# Patient Record
Sex: FEMALE | Race: WHITE | NOT HISPANIC OR LATINO | Employment: FULL TIME | URBAN - METROPOLITAN AREA
[De-identification: names, ages, dates, MRNs, and addresses within clinical notes are randomized per-mention and may not be internally consistent; named-entity substitution may affect disease eponyms.]

---

## 2022-06-29 LAB
EXTERNAL HEPATITIS B SURFACE ANTIGEN: NEGATIVE
EXTERNAL HEPATITIS C AB: NEGATIVE
EXTERNAL RUBELLA QUALITATIVE: NORMAL
EXTERNAL SYPHILIS RPR SCREEN: NORMAL
HIV1 P24 AG SERPL QL IA: NORMAL

## 2022-07-01 ENCOUNTER — TRANSCRIBE ORDERS (OUTPATIENT)
Dept: ULTRASOUND IMAGING | Facility: HOSPITAL | Age: 32
End: 2022-07-01

## 2022-07-01 DIAGNOSIS — D68.51 HETEROZYGOUS FACTOR V LEIDEN MUTATION: Primary | ICD-10-CM

## 2022-08-03 ENCOUNTER — CONSULT (OUTPATIENT)
Dept: ONCOLOGY | Facility: CLINIC | Age: 32
End: 2022-08-03

## 2022-08-03 ENCOUNTER — LAB (OUTPATIENT)
Dept: LAB | Facility: HOSPITAL | Age: 32
End: 2022-08-03

## 2022-08-03 VITALS
OXYGEN SATURATION: 100 % | SYSTOLIC BLOOD PRESSURE: 137 MMHG | RESPIRATION RATE: 18 BRPM | HEART RATE: 70 BPM | TEMPERATURE: 97.3 F | WEIGHT: 179.6 LBS | BODY MASS INDEX: 29.92 KG/M2 | DIASTOLIC BLOOD PRESSURE: 84 MMHG | HEIGHT: 65 IN

## 2022-08-03 DIAGNOSIS — D68.51 HETEROZYGOUS FACTOR V LEIDEN MUTATION: Primary | ICD-10-CM

## 2022-08-03 DIAGNOSIS — Z31.83 IN VITRO FERTILIZATION: ICD-10-CM

## 2022-08-03 DIAGNOSIS — Z34.90 PREGNANCY, UNSPECIFIED GESTATIONAL AGE: ICD-10-CM

## 2022-08-03 DIAGNOSIS — N97.9 INFERTILITY, FEMALE: ICD-10-CM

## 2022-08-03 DIAGNOSIS — O99.119 FACTOR V LEIDEN MUTATION AFFECTING PREGNANCY: Primary | ICD-10-CM

## 2022-08-03 DIAGNOSIS — D68.51 FACTOR V LEIDEN MUTATION AFFECTING PREGNANCY: Primary | ICD-10-CM

## 2022-08-03 DIAGNOSIS — O24.012 TYPE 1 DIABETES MELLITUS AFFECTING PREGNANCY IN SECOND TRIMESTER, ANTEPARTUM: ICD-10-CM

## 2022-08-03 LAB
BASOPHILS # BLD AUTO: 0.04 10*3/MM3 (ref 0–0.2)
BASOPHILS NFR BLD AUTO: 0.4 % (ref 0–1.5)
DEPRECATED RDW RBC AUTO: 43.7 FL (ref 37–54)
EOSINOPHIL # BLD AUTO: 0.08 10*3/MM3 (ref 0–0.4)
EOSINOPHIL NFR BLD AUTO: 0.8 % (ref 0.3–6.2)
ERYTHROCYTE [DISTWIDTH] IN BLOOD BY AUTOMATED COUNT: 13.2 % (ref 12.3–15.4)
HCT VFR BLD AUTO: 37.8 % (ref 34–46.6)
HGB BLD-MCNC: 13.2 G/DL (ref 12–15.9)
IMM GRANULOCYTES # BLD AUTO: 0.12 10*3/MM3 (ref 0–0.05)
IMM GRANULOCYTES NFR BLD AUTO: 1.1 % (ref 0–0.5)
LYMPHOCYTES # BLD AUTO: 1.84 10*3/MM3 (ref 0.7–3.1)
LYMPHOCYTES NFR BLD AUTO: 17.5 % (ref 19.6–45.3)
MCH RBC QN AUTO: 31.8 PG (ref 26.6–33)
MCHC RBC AUTO-ENTMCNC: 34.9 G/DL (ref 31.5–35.7)
MCV RBC AUTO: 91.1 FL (ref 79–97)
MONOCYTES # BLD AUTO: 0.76 10*3/MM3 (ref 0.1–0.9)
MONOCYTES NFR BLD AUTO: 7.2 % (ref 5–12)
NEUTROPHILS NFR BLD AUTO: 7.69 10*3/MM3 (ref 1.7–7)
NEUTROPHILS NFR BLD AUTO: 73 % (ref 42.7–76)
NRBC BLD AUTO-RTO: 0 /100 WBC (ref 0–0.2)
PLATELET # BLD AUTO: 190 10*3/MM3 (ref 140–450)
PMV BLD AUTO: 11.2 FL (ref 6–12)
RBC # BLD AUTO: 4.15 10*6/MM3 (ref 3.77–5.28)
WBC NRBC COR # BLD: 10.53 10*3/MM3 (ref 3.4–10.8)

## 2022-08-03 PROCEDURE — 99204 OFFICE O/P NEW MOD 45 MIN: CPT | Performed by: INTERNAL MEDICINE

## 2022-08-03 PROCEDURE — 85025 COMPLETE CBC W/AUTO DIFF WBC: CPT

## 2022-08-03 PROCEDURE — 36415 COLL VENOUS BLD VENIPUNCTURE: CPT

## 2022-08-03 RX ORDER — PROCHLORPERAZINE 25 MG/1
SUPPOSITORY RECTAL
COMMUNITY
Start: 2022-07-05

## 2022-08-03 RX ORDER — ENOXAPARIN SODIUM 100 MG/ML
INJECTION SUBCUTANEOUS
COMMUNITY
Start: 2022-07-06 | End: 2023-01-08 | Stop reason: HOSPADM

## 2022-08-03 RX ORDER — ASPIRIN 81 MG/1
81 TABLET ORAL DAILY
Status: ON HOLD | COMMUNITY
End: 2023-01-03

## 2022-08-03 RX ORDER — INSULIN LISPRO 100 [IU]/ML
INJECTION, SOLUTION INTRAVENOUS; SUBCUTANEOUS
COMMUNITY
Start: 2022-06-13

## 2022-08-03 RX ORDER — PROCHLORPERAZINE 25 MG/1
SUPPOSITORY RECTAL SEE ADMIN INSTRUCTIONS
COMMUNITY
Start: 2022-06-09

## 2022-08-03 RX ORDER — ENOXAPARIN SODIUM 100 MG/ML
INJECTION SUBCUTANEOUS
COMMUNITY
Start: 2022-07-14 | End: 2022-12-20 | Stop reason: ALTCHOICE

## 2022-08-03 NOTE — PROGRESS NOTES
Subjective     REASON FOR CONSULTATION: Heterozygosity for factor V Leiden  Provide an opinion on any further workup or treatment                             REQUESTING PHYSICIAN:  Melissa Leonard MD    RECORDS OBTAINED:  Records of the patients history including those obtained from the referring provider were reviewed and summarized in detail.    HISTORY OF PRESENT ILLNESS:  The patient is a 32 y.o. year old female who is here for an opinion about the above issue.  She is referred to us from her OB/GYN office for recommendations regarding heterozygosity for factor V Leiden during pregnancy.  She was tested for factor V Leiden due to the fact that her father developed pulmonary embolus and was found to be 8 carrier of factor V Leiden mutation.  She does not have any personal history of thrombosis.     She has been undergoing therapy for infertility and this pregnancy is the result of in vitro fertilization.  She also has higher risk of pregnancy complications due to being a type I diabetic.    She was started on prophylactic Lovenox injections by her fertility MD after egg retrieval.  She has not had any significant side effects or problems from the Lovenox injections.    She also will be having a maternal-fetal medicine visit in September.    Her due date is 1/21/2023 and she plans to deliver by spontaneous vaginal delivery with an epidural if possible.    History of Present Illness     Past Medical History:   Diagnosis Date   • Diabetes (HCC)         Past Surgical History:   Procedure Laterality Date   • EYE SURGERY  2021    Laser eye treatment-diabetic retinopathy        Current Outpatient Medications on File Prior to Visit   Medication Sig Dispense Refill   • aspirin 81 MG EC tablet Take 81 mg by mouth Daily.     • Continuous Blood Gluc Sensor (Dexcom G6 Sensor) APPLY 1 SENSOR EVERY 10 DAYS     • Continuous Blood Gluc Transmit (Dexcom G6 Transmitter) misc See Admin Instructions.     • Enoxaparin Sodium (LOVENOX)  30 MG/0.3ML solution prefilled syringe syringe ADMINISTER 1 SYRINGE UNDER THE SKIN EVERY DAY AS DIRECTED     • Enoxaparin Sodium (LOVENOX) 40 MG/0.4ML solution prefilled syringe syringe INJECT 0.4 MILLITER DAILY     • Insulin Glargine (LANTUS SOLOSTAR) 100 UNIT/ML injection pen Inject 20 Units under the skin into the appropriate area as directed.     • insulin lispro (humaLOG) 100 UNIT/ML injection Inject  under the skin 3 (Three) Times a Day Before Meals.     • Insulin Lispro (humaLOG) 100 UNIT/ML injection USE 50 UNITS IN PUMP AS DIRECTED     • Levonorgestrel-Ethinyl Estrad (VIENVA PO) Take  by mouth.     • Loratadine (CLARITIN PO) Take  by mouth.     • Prenatal Vit-Fe Fumarate-FA (PRENATAL VITAMIN PO) Take  by mouth.       No current facility-administered medications on file prior to visit.        ALLERGIES:    Allergies   Allergen Reactions   • Adhesive Tape Rash     Very sensitive        Social History     Socioeconomic History   • Marital status:    Tobacco Use   • Smoking status: Never Smoker   • Smokeless tobacco: Never Used   Substance and Sexual Activity   • Alcohol use: Yes        Family History   Problem Relation Age of Onset   • Diabetes Other         Maternal        Review of Systems   Constitutional: Negative for activity change, chills, fatigue and fever.   HENT: Negative for mouth sores, trouble swallowing and voice change.    Eyes: Negative for pain and visual disturbance.   Respiratory: Negative for cough, shortness of breath and wheezing.    Cardiovascular: Negative for chest pain and palpitations.   Gastrointestinal: Negative for abdominal pain, constipation, diarrhea, nausea and vomiting.   Genitourinary: Negative for difficulty urinating, frequency and urgency.   Musculoskeletal: Negative for arthralgias and joint swelling.   Skin: Negative for rash.   Neurological: Negative for dizziness, seizures, weakness and headaches.   Hematological: Negative for adenopathy. Does not bruise/bleed  "easily.   Psychiatric/Behavioral: Negative for behavioral problems and confusion. The patient is not nervous/anxious.         Objective     Vitals:    08/03/22 1018   BP: 137/84   Pulse: 70   Resp: 18   Temp: 97.3 °F (36.3 °C)   TempSrc: Temporal   SpO2: 100%   Weight: 81.5 kg (179 lb 9.6 oz)   Height: 165.1 cm (65\")   PainSc: 0-No pain     Current Status 8/3/2022   ECOG score 0       Physical Exam  Constitutional:       General: She is not in acute distress.     Appearance: She is well-developed.   HENT:      Head: Normocephalic.   Eyes:      General: No scleral icterus.     Conjunctiva/sclera: Conjunctivae normal.      Pupils: Pupils are equal, round, and reactive to light.   Neck:      Thyroid: No thyromegaly.      Vascular: No JVD.   Cardiovascular:      Rate and Rhythm: Normal rate and regular rhythm.      Heart sounds: No murmur heard.    No friction rub. No gallop.   Pulmonary:      Effort: Pulmonary effort is normal.      Breath sounds: Normal breath sounds. No wheezing or rales.   Abdominal:      General: There is no distension.      Palpations: Abdomen is soft. There is no mass.      Tenderness: There is no abdominal tenderness.   Musculoskeletal:         General: No deformity. Normal range of motion.      Cervical back: Normal range of motion and neck supple.   Lymphadenopathy:      Cervical: No cervical adenopathy.   Skin:     General: Skin is warm and dry.      Findings: No erythema or rash.   Neurological:      Mental Status: She is alert and oriented to person, place, and time.      Cranial Nerves: No cranial nerve deficit.      Deep Tendon Reflexes: Reflexes are normal and symmetric.   Psychiatric:         Behavior: Behavior normal.         Judgment: Judgment normal.           RECENT LABS:  Hematology WBC   Date Value Ref Range Status   08/03/2022 10.53 3.40 - 10.80 10*3/mm3 Final     RBC   Date Value Ref Range Status   08/03/2022 4.15 3.77 - 5.28 10*6/mm3 Final     Hemoglobin   Date Value Ref Range " Status   08/03/2022 13.2 12.0 - 15.9 g/dL Final     Hematocrit   Date Value Ref Range Status   08/03/2022 37.8 34.0 - 46.6 % Final     Platelets   Date Value Ref Range Status   08/03/2022 190 140 - 450 10*3/mm3 Final          Assessment & Plan     1.  Inherited thrombophilia with heterozygosity for factor V Leiden.  She has no personal history of thrombosis to date.  2.  Pregnancy as a result of in vitro fertilization.  3.  Type 1 diabetes.    Recommendations  1.  We discussed the pros and cons of continuing prophylactic Lovenox with the patient at length today.  We explained that in general if she did not have any other confounding issues we would not routinely anticoagulate her due to the heterozygous factor V Leiden mutation since she has not had any personal history of thrombosis.  However, this is a ger pregnancy with the patient having issues with infertility in this pregnancy being the result of in vitro fertilization.  She also is felt to be higher risk pregnancy due to her type 1 diabetes.  With all these factors taken into consideration we have recommended that she continue on prophylactic Lovenox injections throughout the pregnancy with plans to switch to unfractionated heparin at 36-38 weeks gestation.  2.  We will continue to see the patient monthly while she is on the Lovenox injections and will continue to monitor her blood counts closely.  3.  I told the patient that we would be happy to switch her to unfractionated heparin at the appropriate time.  4.  The patient was instructed to notify us immediately if she has any unusual vaginal spotting or other types of bleeding.  5.  I told the patient at this time that she could discontinue her baby aspirin.  6.  We will make a decision down the road regarding whether or not to continue prophylactic anticoagulation postpartum.    Thanks for allowing us to see this nice patient in consultation.

## 2022-08-04 ENCOUNTER — APPOINTMENT (OUTPATIENT)
Dept: LAB | Facility: HOSPITAL | Age: 32
End: 2022-08-04

## 2022-09-07 ENCOUNTER — TRANSCRIBE ORDERS (OUTPATIENT)
Dept: ULTRASOUND IMAGING | Facility: HOSPITAL | Age: 32
End: 2022-09-07

## 2022-09-07 ENCOUNTER — OFFICE VISIT (OUTPATIENT)
Dept: OBSTETRICS AND GYNECOLOGY | Facility: CLINIC | Age: 32
End: 2022-09-07

## 2022-09-07 ENCOUNTER — HOSPITAL ENCOUNTER (OUTPATIENT)
Dept: ULTRASOUND IMAGING | Facility: HOSPITAL | Age: 32
Discharge: HOME | End: 2022-09-07
Admitting: OBSTETRICS & GYNECOLOGY

## 2022-09-07 VITALS
WEIGHT: 185.6 LBS | DIASTOLIC BLOOD PRESSURE: 67 MMHG | SYSTOLIC BLOOD PRESSURE: 115 MMHG | TEMPERATURE: 97.9 F | BODY MASS INDEX: 29.83 KG/M2 | HEART RATE: 81 BPM | HEIGHT: 66 IN

## 2022-09-07 DIAGNOSIS — O24.012 TYPE 1 DIABETES MELLITUS AFFECTING PREGNANCY IN SECOND TRIMESTER, ANTEPARTUM: Primary | ICD-10-CM

## 2022-09-07 DIAGNOSIS — D68.51 HETEROZYGOUS FACTOR V LEIDEN MUTATION: Primary | ICD-10-CM

## 2022-09-07 DIAGNOSIS — O24.012 TYPE 1 DIABETES MELLITUS AFFECTING PREGNANCY IN SECOND TRIMESTER, ANTEPARTUM: ICD-10-CM

## 2022-09-07 DIAGNOSIS — D68.51 HETEROZYGOUS FACTOR V LEIDEN MUTATION: ICD-10-CM

## 2022-09-07 DIAGNOSIS — O99.119 FACTOR V LEIDEN MUTATION AFFECTING PREGNANCY: ICD-10-CM

## 2022-09-07 DIAGNOSIS — D68.51 FACTOR V LEIDEN MUTATION AFFECTING PREGNANCY: ICD-10-CM

## 2022-09-07 PROCEDURE — 76817 TRANSVAGINAL US OBSTETRIC: CPT

## 2022-09-07 PROCEDURE — 76811 OB US DETAILED SNGL FETUS: CPT

## 2022-09-07 PROCEDURE — 76811 OB US DETAILED SNGL FETUS: CPT | Performed by: OBSTETRICS & GYNECOLOGY

## 2022-09-07 PROCEDURE — 99204 OFFICE O/P NEW MOD 45 MIN: CPT | Performed by: OBSTETRICS & GYNECOLOGY

## 2022-09-07 PROCEDURE — 76817 TRANSVAGINAL US OBSTETRIC: CPT | Performed by: OBSTETRICS & GYNECOLOGY

## 2022-09-07 RX ORDER — ASPIRIN 81 MG/1
81 TABLET ORAL DAILY
Qty: 90 TABLET | Refills: 4 | Status: SHIPPED | OUTPATIENT
Start: 2022-09-07 | End: 2023-01-08 | Stop reason: HOSPADM

## 2022-09-07 NOTE — PROGRESS NOTES
MATERNAL FETAL MEDICINE Consult Note    Dear  No ref. provider found:    Thank you for your kind referral of Pat Finley.  As you know, she is a 32 y.o. G  P  at   /7 weeks gestation (Estimated Date of Delivery: None noted.). This is a consult    Her antepartum course is complicated by:  Type 1 DM  Factor V Leiden Heterozygous with FH PE    Aneuploidy Screening: low risk panorama male      HPI: Today, she denies headache, blurry vision, RUQ pain. No vaginal bleeding, no contractions.     Review of History:  Past Medical History:   Diagnosis Date   • Diabetes type I (HCC)     [facility]- Dr. Win and Anita MA   • Heterozygous factor V Leiden mutation (HCC)    •  product of IVF pregnancy      Past Surgical History:   Procedure Laterality Date   • EYE SURGERY      Laser eye treatment-diabetic retinopathy       Social History     Socioeconomic History   • Marital status:    Tobacco Use   • Smoking status: Never Smoker   • Smokeless tobacco: Never Used   Vaping Use   • Vaping Use: Never used   Substance and Sexual Activity   • Alcohol use: Not Currently   • Drug use: Never   • Sexual activity: Yes     Partners: Male     Family History   Problem Relation Age of Onset   • Factor V Leiden deficiency Father    • Pulmonary embolism Father    • Diabetes Other         Maternal      Allergies   Allergen Reactions   • Adhesive Tape Rash     Very sensitive      Current Outpatient Medications on File Prior to Visit   Medication Sig Dispense Refill   • Continuous Blood Gluc Sensor (Dexcom G6 Sensor) APPLY 1 SENSOR EVERY 10 DAYS     • Continuous Blood Gluc Transmit (Dexcom G6 Transmitter) misc See Admin Instructions.     • Enoxaparin Sodium (LOVENOX) 40 MG/0.4ML solution prefilled syringe syringe INJECT 0.4 MILLITER DAILY     • Insulin Glargine (LANTUS SOLOSTAR) 100 UNIT/ML injection pen Inject 20 Units under the skin into the appropriate area as directed. As needed for  "pump failure     • Insulin Lispro (humaLOG) 100 UNIT/ML injection USE 50 UNITS IN PUMP AS DIRECTED     • Prenatal Vit-Fe Fumarate-FA (PRENATAL VITAMIN PO) Take  by mouth.     • aspirin 81 MG EC tablet Take 81 mg by mouth Daily.     • Enoxaparin Sodium (LOVENOX) 30 MG/0.3ML solution prefilled syringe syringe ADMINISTER 1 SYRINGE UNDER THE SKIN EVERY DAY AS DIRECTED     • insulin lispro (humaLOG) 100 UNIT/ML injection Inject  under the skin 3 (Three) Times a Day Before Meals.     • Loratadine (CLARITIN PO) Take  by mouth.       No current facility-administered medications on file prior to visit.        Past obstetric, gynecological, medical, surgical, family and social history reviewed.  Relevant lab work and imaging reviewed.    Review of systems  Constitutional:  denies fever, chills, malaise.   ENT/Mouth:  denies sore throat, tinnitis  Eyes: denies vision changes/pain  CV:  denies chest pain  Respiratory:  denies cough/SOB  GI:  denies N/V, diarrhea, abdominal pain.    :   denies dysuria  Skin:  denies lesions or pruritis   Neuro:  denies weakness, focal neurologic symptoms    Vitals:    22 0903   BP: 115/67   BP Location: Right arm   Patient Position: Sitting   Pulse: 81   Temp: 97.9 °F (36.6 °C)   TempSrc: Temporal   Weight: 84.2 kg (185 lb 9.6 oz)   Height: 167.6 cm (66\")       PHYSICAL EXAM   GENERAL: Not in acute distress, AAOx3, pleasant  CARDIO: regular rate and rhythm  PULM: symmetric chest rise, speaking in complete sentences without difficulty  NEURO: awake, alert and oriented to person, place, and time  ABDOMINAL: No fundal tenderness, no rebound or guarding, gravid  EXTREMITIES: no bilateral lower extremity edema/tenderness  SKIN: Warm, well-perfused      ULTRASOUND   Please view full ultrasound note on Imaging tab in [employer].      ASSESSMENT/COUNSELIN y.o. G1  P  at 20 4 /7 weeks gestation (Estimated Date of Delivery: None noted.)    -Pregnancy  [ X ] stable  [   ] improving [  ] " worsening    There are no diagnoses linked to this encounter.     -Factor V Leiden heterozygous   Has met with hematology and decision was made to continue prophylactic lovenox due to IVF and Type 1 DM.  I would also recommend postpartum anticoagulation if we are going to anticoagulate her in the antepartum period as postpartum is the highest risk time for a blood clot and postpartum anticoagulation should never be less than antepartum for this reason.  She should be continued for 6 weeks postpartum on Lovenox, especially given her family history of a PE in her father.      Pregestational DM, Type 1  Counseling: I explained to her that patients with pregestational diabetes have a variety of pregnancy related risks that are related to their level of glycemic control.  She reports she has not had a recent optho appt and had an EKG within the last couple years, but we also ordered that.      We discussed  the increased risks related to diabetes including but not limited to congenital anomalies, fetal growth disorders (FGR or LGA), indicated or spontaneous  birth, macrosomia, polyhydramnios, birth trauma,  complications (jaundice, hypoglycemia, NICU admit) and stillbirth with uncontrolled glucose.   We discussed all of this increased linearly with increasing blood sugars and A1c and her last A1c was <5.    We discussed goal pre-prandial values below 90 and 1-hour post-prandial values below 120 to optimize her pregnancy outcome as many studies have demonstrated that this is the normal range for pregnant women without diabetes.  She is seeing her endocrinologist and would like to continue managing with them.  I offered our services if she ever wants to discuss further as the pregnancy progresses.       We also discussed preeclampsia risk reduction with baby aspirin, which she will begin taking daily.    I explained that I recommend fetal echocardiogram after her midtrimester as well as detailed fetal  ultrasound for anatomy at that time in our office.      CP cyst:  Discussed that in the setting of a normal cell free DNA, this had no clincal significance and would not cause the baby any issues.  I fully expect it to be resolved on her next couple of scans.     Summary of Plan  -Serial growth ultrasounds every 4 weeks with 1-2x per week  fetal surveillance after 32 weeks.  She would like to do these here.     -Follow with endo per pt request for [id number] are available at the [facility] office if she changes her mind      Follow-up: Follow up 4 weeks growth.  2 weeks fetal ECHO for T1DM and IVF.      Thank you for the consult and opportunity to care for this patient.  Please feel free to reach out with any questions or concerns.      I spent 35 minutes caring for this patient on this date of service. This time includes time spent by me in the following activities: preparing for the visit, reviewing tests, obtaining and/or reviewing a separately obtained history, performing a medically appropriate examination and/or evaluation, counseling and educating the patient/family/caregiver and independently interpreting results and communicating that information with the patient/family/caregiver with greater than 50% spent in counseling and coordination of care.     Lawanda Valente MD FACOG  Maternal Fetal Medicine-[facility]  Office: [phone]  [email or web address]

## 2022-09-07 NOTE — PROGRESS NOTES
Pt reports that she is doing well and denies vaginal bleeding, cramping, contractions or LOF at this time. Reports feeling flutters at this time. Denies HA, visual changes or epigastric pain. Denies any additional complaints at time of appointment. Next OB appointment scheduled for next week.   Pt reports her Type I DM is currently being managed by her endocrinologist at this time. Notes that she sees them in office once a month, with last appointment being yesterday (9/6) and that she is in communication with the PA every other week. Notes that her most recent A1C was 4.8.    Vitals:    09/07/22 0903   BP: 115/67   Pulse: 81   Temp: 97.9 °F (36.6 °C)

## 2022-09-08 ENCOUNTER — LAB (OUTPATIENT)
Dept: LAB | Facility: HOSPITAL | Age: 32
End: 2022-09-08

## 2022-09-08 ENCOUNTER — OFFICE VISIT (OUTPATIENT)
Dept: ONCOLOGY | Facility: CLINIC | Age: 32
End: 2022-09-08

## 2022-09-08 ENCOUNTER — APPOINTMENT (OUTPATIENT)
Dept: LAB | Facility: HOSPITAL | Age: 32
End: 2022-09-08

## 2022-09-08 VITALS
SYSTOLIC BLOOD PRESSURE: 113 MMHG | HEIGHT: 66 IN | TEMPERATURE: 97.3 F | DIASTOLIC BLOOD PRESSURE: 76 MMHG | HEART RATE: 81 BPM | RESPIRATION RATE: 18 BRPM | BODY MASS INDEX: 29.68 KG/M2 | OXYGEN SATURATION: 100 % | WEIGHT: 184.7 LBS

## 2022-09-08 DIAGNOSIS — O24.012 TYPE 1 DIABETES MELLITUS AFFECTING PREGNANCY IN SECOND TRIMESTER, ANTEPARTUM: ICD-10-CM

## 2022-09-08 DIAGNOSIS — Z34.90 PREGNANCY, UNSPECIFIED GESTATIONAL AGE: ICD-10-CM

## 2022-09-08 DIAGNOSIS — O99.119 FACTOR V LEIDEN MUTATION AFFECTING PREGNANCY: ICD-10-CM

## 2022-09-08 DIAGNOSIS — Z31.83 IN VITRO FERTILIZATION: ICD-10-CM

## 2022-09-08 DIAGNOSIS — N97.9 INFERTILITY, FEMALE: ICD-10-CM

## 2022-09-08 DIAGNOSIS — O99.119 FACTOR V LEIDEN MUTATION AFFECTING PREGNANCY: Primary | ICD-10-CM

## 2022-09-08 DIAGNOSIS — D68.51 FACTOR V LEIDEN MUTATION AFFECTING PREGNANCY: ICD-10-CM

## 2022-09-08 DIAGNOSIS — D68.51 FACTOR V LEIDEN MUTATION AFFECTING PREGNANCY: Primary | ICD-10-CM

## 2022-09-08 LAB
BASOPHILS # BLD AUTO: 0.03 10*3/MM3 (ref 0–0.2)
BASOPHILS NFR BLD AUTO: 0.3 % (ref 0–1.5)
DEPRECATED RDW RBC AUTO: 43.4 FL (ref 37–54)
EOSINOPHIL # BLD AUTO: 0.08 10*3/MM3 (ref 0–0.4)
EOSINOPHIL NFR BLD AUTO: 0.7 % (ref 0.3–6.2)
ERYTHROCYTE [DISTWIDTH] IN BLOOD BY AUTOMATED COUNT: 13.1 % (ref 12.3–15.4)
HCT VFR BLD AUTO: 34.7 % (ref 34–46.6)
HGB BLD-MCNC: 12 G/DL (ref 12–15.9)
IMM GRANULOCYTES # BLD AUTO: 0.1 10*3/MM3 (ref 0–0.05)
IMM GRANULOCYTES NFR BLD AUTO: 0.8 % (ref 0–0.5)
LYMPHOCYTES # BLD AUTO: 1.74 10*3/MM3 (ref 0.7–3.1)
LYMPHOCYTES NFR BLD AUTO: 14.7 % (ref 19.6–45.3)
MCH RBC QN AUTO: 31.7 PG (ref 26.6–33)
MCHC RBC AUTO-ENTMCNC: 34.6 G/DL (ref 31.5–35.7)
MCV RBC AUTO: 91.8 FL (ref 79–97)
MONOCYTES # BLD AUTO: 0.76 10*3/MM3 (ref 0.1–0.9)
MONOCYTES NFR BLD AUTO: 6.4 % (ref 5–12)
NEUTROPHILS NFR BLD AUTO: 77.1 % (ref 42.7–76)
NEUTROPHILS NFR BLD AUTO: 9.16 10*3/MM3 (ref 1.7–7)
NRBC BLD AUTO-RTO: 0 /100 WBC (ref 0–0.2)
PLATELET # BLD AUTO: 221 10*3/MM3 (ref 140–450)
PMV BLD AUTO: 10.9 FL (ref 6–12)
RBC # BLD AUTO: 3.78 10*6/MM3 (ref 3.77–5.28)
WBC NRBC COR # BLD: 11.87 10*3/MM3 (ref 3.4–10.8)

## 2022-09-08 PROCEDURE — 99213 OFFICE O/P EST LOW 20 MIN: CPT | Performed by: INTERNAL MEDICINE

## 2022-09-08 PROCEDURE — 85025 COMPLETE CBC W/AUTO DIFF WBC: CPT

## 2022-09-08 PROCEDURE — 36415 COLL VENOUS BLD VENIPUNCTURE: CPT

## 2022-09-08 NOTE — PROGRESS NOTES
Subjective     REASON FOR CONSULTATION: Heterozygosity for factor V Leiden  Provide an opinion on any further workup or treatment                             REQUESTING PHYSICIAN:  Melissa Leonard MD    RECORDS OBTAINED:  Records of the patients history including those obtained from the referring provider were reviewed and summarized in detail.    HISTORY OF PRESENT ILLNESS:  The patient is a 32 y.o. year old female who is here for an opinion about the above issue.  She is referred to us from her OB/GYN office for recommendations regarding heterozygosity for factor V Leiden during pregnancy.  She was tested for factor V Leiden due to the fact that her father developed pulmonary embolus and was found to be 8 carrier of factor V Leiden mutation.  She does not have any personal history of thrombosis.     She has been undergoing therapy for infertility and this pregnancy is the result of in vitro fertilization.  She also has higher risk of pregnancy complications due to being a type I diabetic.    She was started on prophylactic Lovenox injections by her fertility MD after egg retrieval.  She has not had any significant side effects or problems from the Lovenox injections.    Her due date is 2023 and she plans to deliver by spontaneous vaginal delivery with an epidural if possible.    Her hemoglobin is normal today and she looks great.  She is now being followed by maternal-fetal medicine with plans to continue prophylactic anticoagulation during pregnancy and postpartum.    History of Present Illness     Past Medical History:   Diagnosis Date   • Diabetes type I (HCC)     [facility]- Dr. Win and Anita MA   • Heterozygous factor V Leiden mutation (HCC)    •  product of IVF pregnancy         Past Surgical History:   Procedure Laterality Date   • EYE SURGERY      Laser eye treatment-diabetic retinopathy        Current Outpatient Medications on File Prior to  Visit   Medication Sig Dispense Refill   • aspirin (aspirin) 81 MG EC tablet Take 1 tablet by mouth Daily. 90 tablet 4   • aspirin 81 MG EC tablet Take 81 mg by mouth Daily.     • Continuous Blood Gluc Sensor (Dexcom G6 Sensor) APPLY 1 SENSOR EVERY 10 DAYS     • Continuous Blood Gluc Transmit (Dexcom G6 Transmitter) misc See Admin Instructions.     • Enoxaparin Sodium (LOVENOX) 30 MG/0.3ML solution prefilled syringe syringe ADMINISTER 1 SYRINGE UNDER THE SKIN EVERY DAY AS DIRECTED     • Enoxaparin Sodium (LOVENOX) 40 MG/0.4ML solution prefilled syringe syringe INJECT 0.4 MILLITER DAILY     • Insulin Glargine (LANTUS SOLOSTAR) 100 UNIT/ML injection pen Inject 20 Units under the skin into the appropriate area as directed. As needed for pump failure     • insulin lispro (humaLOG) 100 UNIT/ML injection Inject  under the skin 3 (Three) Times a Day Before Meals.     • Insulin Lispro (humaLOG) 100 UNIT/ML injection USE 50 UNITS IN PUMP AS DIRECTED     • Loratadine (CLARITIN PO) Take  by mouth.     • Prenatal Vit-Fe Fumarate-FA (PRENATAL VITAMIN PO) Take  by mouth.       No current facility-administered medications on file prior to visit.        ALLERGIES:    Allergies   Allergen Reactions   • Adhesive Tape Rash     Very sensitive        Social History     Socioeconomic History   • Marital status:    Tobacco Use   • Smoking status: Never Smoker   • Smokeless tobacco: Never Used   Vaping Use   • Vaping Use: Never used   Substance and Sexual Activity   • Alcohol use: Not Currently   • Drug use: Never   • Sexual activity: Yes     Partners: Male        Family History   Problem Relation Age of Onset   • Factor V Leiden deficiency Father    • Pulmonary embolism Father    • Diabetes Other         Maternal        Review of Systems   Constitutional: Negative for activity change, chills, fatigue and fever.   HENT: Negative for mouth sores, trouble swallowing and voice change.    Eyes: Negative for pain and visual disturbance.  "  Respiratory: Negative for cough, shortness of breath and wheezing.    Cardiovascular: Negative for chest pain and palpitations.   Gastrointestinal: Negative for abdominal pain, constipation, diarrhea, nausea and vomiting.   Genitourinary: Negative for difficulty urinating, frequency and urgency.   Musculoskeletal: Negative for arthralgias and joint swelling.   Skin: Negative for rash.   Neurological: Negative for dizziness, seizures, weakness and headaches.   Hematological: Negative for adenopathy. Does not bruise/bleed easily.   Psychiatric/Behavioral: Negative for behavioral problems and confusion. The patient is not nervous/anxious.         Objective     Vitals:    09/08/22 1539   BP: 113/76   Pulse: 81   Resp: 18   Temp: 97.3 °F (36.3 °C)   TempSrc: Temporal   SpO2: 100%   Weight: 83.8 kg (184 lb 11.2 oz)   Height: 167.6 cm (65.98\")   PainSc: 0-No pain     Current Status 9/8/2022   ECOG score 0       Physical Exam  Constitutional:       General: She is not in acute distress.     Appearance: She is well-developed.   HENT:      Head: Normocephalic.   Eyes:      General: No scleral icterus.     Conjunctiva/sclera: Conjunctivae normal.      Pupils: Pupils are equal, round, and reactive to light.   Neck:      Thyroid: No thyromegaly.      Vascular: No JVD.   Cardiovascular:      Rate and Rhythm: Normal rate and regular rhythm.      Heart sounds: No murmur heard.    No friction rub. No gallop.   Pulmonary:      Effort: Pulmonary effort is normal.      Breath sounds: Normal breath sounds. No wheezing or rales.   Abdominal:      General: There is no distension.      Palpations: Abdomen is soft. There is no mass.      Tenderness: There is no abdominal tenderness.   Musculoskeletal:         General: No deformity. Normal range of motion.      Cervical back: Normal range of motion and neck supple.   Lymphadenopathy:      Cervical: No cervical adenopathy.   Skin:     General: Skin is warm and dry.      Findings: No " erythema or rash.   Neurological:      Mental Status: She is alert and oriented to person, place, and time.      Cranial Nerves: No cranial nerve deficit.      Deep Tendon Reflexes: Reflexes are normal and symmetric.   Psychiatric:         Behavior: Behavior normal.         Judgment: Judgment normal.           RECENT LABS:  Hematology WBC   Date Value Ref Range Status   09/08/2022 11.87 (H) 3.40 - 10.80 10*3/mm3 Final     RBC   Date Value Ref Range Status   09/08/2022 3.78 3.77 - 5.28 10*6/mm3 Final     Hemoglobin   Date Value Ref Range Status   09/08/2022 12.0 12.0 - 15.9 g/dL Final     Hematocrit   Date Value Ref Range Status   09/08/2022 34.7 34.0 - 46.6 % Final     Platelets   Date Value Ref Range Status   09/08/2022 221 140 - 450 10*3/mm3 Final          Assessment & Plan     1.  Inherited thrombophilia with heterozygosity for factor V Leiden.  She has no personal history of thrombosis to date.  2.  Pregnancy as a result of in vitro fertilization.  3.  Type 1 diabetes.    PLAN:  1.  The patient seems to be doing well and is now being followed by maternal-fetal medicine.  2.  We have not scheduled routine follow-up in our office at this point but certainly will remain available to help out in any way we can if new concerns arise.  We will defer to maternal-fetal medicine regarding continued anticoagulation pre and post partum.    Thanks for allowing us to see this very nice patient in consultation.

## 2022-09-20 ENCOUNTER — HOSPITAL ENCOUNTER (OUTPATIENT)
Dept: ULTRASOUND IMAGING | Facility: HOSPITAL | Age: 32
Discharge: HOME | End: 2022-09-20
Admitting: OBSTETRICS & GYNECOLOGY

## 2022-09-20 DIAGNOSIS — D68.51 HETEROZYGOUS FACTOR V LEIDEN MUTATION: ICD-10-CM

## 2022-09-20 DIAGNOSIS — O24.012 TYPE 1 DIABETES MELLITUS AFFECTING PREGNANCY IN SECOND TRIMESTER, ANTEPARTUM: ICD-10-CM

## 2022-09-20 PROCEDURE — 76825 ECHO EXAM OF FETAL HEART: CPT

## 2022-09-20 PROCEDURE — 76827 ECHO EXAM OF FETAL HEART: CPT

## 2022-09-20 PROCEDURE — 93325 DOPPLER ECHO COLOR FLOW MAPG: CPT

## 2022-09-20 NOTE — PROGRESS NOTES
Fetal Cardiology Outpatient Consult  Note    Patient Name:Pat Finley  :1990  Medical Record Number:[id number]  Date of Service:2022  Requesting Obstetrician: Dr. Valente [facility]  Primary Obstetrician: Dr. Melissa Leonard  Consult Location: [facility]    Reason for Visit:  Maternal Diabetes, Assisted Reproduction    History: I had the pleasure of seeing your patient, Pat Finley, today for a Fetal Cardiology Initial Consultation.  Fetal cardiology evaluation was requested due to Maternal Diabetes.      Pat is a 32 y.o. woman who presents today at 22 weeks gestation, with a given due date of 2023.  This pregnancy was conceived using IVF. Pregnancy has been complicated by maternal pre-gestational type I diabetes.  NIPT was performed and was low risk.    OB History        1    Para        Term                AB        Living           SAB        IAB        Ectopic        Molar        Multiple        Live Births                    Past Medical History:  Past Medical History:   Diagnosis Date   • Diabetes type I (HCC)     [facility]- Dr. Win and Anita MA   • Heterozygous factor V Leiden mutation (HCC)    •  product of IVF pregnancy        Current Medications:    Current Outpatient Medications:   •  aspirin (aspirin) 81 MG EC tablet, Take 1 tablet by mouth Daily., Disp: 90 tablet, Rfl: 4  •  aspirin 81 MG EC tablet, Take 81 mg by mouth Daily., Disp: , Rfl:   •  Continuous Blood Gluc Sensor (Dexcom G6 Sensor), APPLY 1 SENSOR EVERY 10 DAYS, Disp: , Rfl:   •  Continuous Blood Gluc Transmit (Dexcom G6 Transmitter) misc, See Admin Instructions., Disp: , Rfl:   •  Enoxaparin Sodium (LOVENOX) 30 MG/0.3ML solution prefilled syringe syringe, ADMINISTER 1 SYRINGE UNDER THE SKIN EVERY DAY AS DIRECTED, Disp: , Rfl:   •  Enoxaparin Sodium (LOVENOX) 40 MG/0.4ML solution prefilled syringe syringe, INJECT 0.4  MILLITER DAILY, Disp: , Rfl:   •  Insulin Glargine (LANTUS SOLOSTAR) 100 UNIT/ML injection pen, Inject 20 Units under the skin into the appropriate area as directed. As needed for pump failure, Disp: , Rfl:   •  insulin lispro (humaLOG) 100 UNIT/ML injection, Inject  under the skin 3 (Three) Times a Day Before Meals., Disp: , Rfl:   •  Insulin Lispro (humaLOG) 100 UNIT/ML injection, USE 50 UNITS IN PUMP AS DIRECTED, Disp: , Rfl:   •  Loratadine (CLARITIN PO), Take  by mouth., Disp: , Rfl:   •  Prenatal Vit-Fe Fumarate-FA (PRENATAL VITAMIN PO), Take  by mouth., Disp: , Rfl:     Family History:  Family History   Problem Relation Age of Onset   • Factor V Leiden deficiency Father    • Pulmonary embolism Father    • Diabetes Other         Maternal     There is no known family history of congenital heart disease or genetic abnormalities or early childhood death.    Imaging: A complete 2-D, color, and spectral doppler fetal echocardiogram was performed.  A full report is available separately.  In summary, today's findings show the following:     - Normal fetal cardiac anatomy and function at 22 weeks gestation.    A complete, detailed fetal echocardiogram can identify most major heart defects.  However, there are known limitations to this examination including a limited ability to diagnose some small atrial or ventricular septal defects, minor valve abnormalities, persistent patency of the ductus arteriosus, coarctation of the aorta, and abnormalities in small structures such as coronary arteries and pulmonary veins.    Discussion:   Findings were explained to patient.  The echo display screens in our examination room   were used.  Questions were answered at length and patient expressed understanding.  I explained the normal fetal circulation, today's fetal echocardiogram findings, the expected course of pregnancy, the impact of today's results on the location and mode of delivery and the expected  course.      Impression: In summary, today's evaluation found the followin) Normal fetal cardiac anatomy and function.  2) 22 weeks gestation  3) Maternal pre-gestational type I diabetes  4) Assisted Reproduction using IVF    Recommendations:   1. There is no evidence of a ductal dependent fetal cardiac lesion.  I do not anticipate the need for immediate initiation of prostaglandin therapy and pediatric cardiology evaluation after birth.  2. There is no fetal cardiac indication to delivery at a hospital which provides specialized pediatric cardiac care.  Pat is currently planning to delivery at [facility], which is appropriate from the standpoint of the fetal heart and circulation.  3. There is no increased fetal cardiac risk from a trial of labor and vaginal or cesearian delivery based on today's fetal cardiac findings.  4. Based on the results of today's examination, no further fetal echocardiograms are recommended during pregnancy.  5. Recommend routine  and well  after birth by a primary care provider, further evaluation by pediatric cardiology is recommended only as needed.  6. I would be happy to see Pat again during pregnancy for any changes, problems, or concerns that may arise.  Please do not hesitate to call with any questions you may have.    Thank you for allowing me to share with you in the care of your patient.    I personally spent 60 minutes of direct provider time for the visit today, including review of prior OB and MFM medical records, face-to-face discussion and review of fetal cardiac images in the examination room, and charting.     Michael Jalloh MD  [facility]  Pediatric Cardiology  [phone]    2022  13:58 EDT

## 2022-10-04 NOTE — PROGRESS NOTES
MATERNAL FETAL MEDICINE Consult Note    Dear Dr Melissa Leonard MD:    Thank you for your kind referral of Pat Finley.  As you know, she is a 32 y.o. G1  P  at 24 4 /7 weeks gestation (Estimated Date of Delivery: None noted.). This is a consult    Her antepartum course is complicated by:  Type 1 DM  Factor V Leiden Heterozygous with FH PE    Aneuploidy Screening: low risk panorama male      HPI: Today, she denies headache, blurry vision, RUQ pain. No vaginal bleeding, no contractions.     Review of History:  Past Medical History:   Diagnosis Date   • Diabetes type I (HCC)     [facility]- Dr. Win and Anita MA   • Heterozygous factor V Leiden mutation (HCC)    •  product of IVF pregnancy      Past Surgical History:   Procedure Laterality Date   • EYE SURGERY      Laser eye treatment-diabetic retinopathy       Social History     Socioeconomic History   • Marital status:    Tobacco Use   • Smoking status: Never Smoker   • Smokeless tobacco: Never Used   Vaping Use   • Vaping Use: Never used   Substance and Sexual Activity   • Alcohol use: Not Currently   • Drug use: Never   • Sexual activity: Yes     Partners: Male     Family History   Problem Relation Age of Onset   • Factor V Leiden deficiency Father    • Pulmonary embolism Father    • Diabetes Other         Maternal      Allergies   Allergen Reactions   • Adhesive Tape Rash     Very sensitive      Current Outpatient Medications on File Prior to Visit   Medication Sig Dispense Refill   • aspirin (aspirin) 81 MG EC tablet Take 1 tablet by mouth Daily. 90 tablet 4   • Continuous Blood Gluc Sensor (Dexcom G6 Sensor) APPLY 1 SENSOR EVERY 10 DAYS     • Continuous Blood Gluc Transmit (Dexcom G6 Transmitter) misc See Admin Instructions.     • Enoxaparin Sodium (LOVENOX) 40 MG/0.4ML solution prefilled syringe syringe INJECT 0.4 MILLITER DAILY     • Insulin Lispro (humaLOG) 100 UNIT/ML injection USE 50 UNITS IN  PUMP AS DIRECTED     • Prenatal Vit-Fe Fumarate-FA (PRENATAL VITAMIN PO) Take  by mouth.     • aspirin 81 MG EC tablet Take 81 mg by mouth Daily.     • Enoxaparin Sodium (LOVENOX) 30 MG/0.3ML solution prefilled syringe syringe ADMINISTER 1 SYRINGE UNDER THE SKIN EVERY DAY AS DIRECTED     • Insulin Glargine (LANTUS SOLOSTAR) 100 UNIT/ML injection pen Inject 20 Units under the skin into the appropriate area as directed. Back up as needed for pump failure     • insulin lispro (humaLOG) 100 UNIT/ML injection Inject  under the skin 3 (Three) Times a Day Before Meals.     • Loratadine (CLARITIN PO) Take  by mouth.       No current facility-administered medications on file prior to visit.        Past obstetric, gynecological, medical, surgical, family and social history reviewed.  Relevant lab work and imaging reviewed.    Review of systems  Constitutional:  denies fever, chills, malaise.   ENT/Mouth:  denies sore throat, tinnitis  Eyes: denies vision changes/pain  CV:  denies chest pain  Respiratory:  denies cough/SOB  GI:  denies N/V, diarrhea, abdominal pain.    :   denies dysuria  Skin:  denies lesions or pruritis   Neuro:  denies weakness, focal neurologic symptoms    Vitals:    10/05/22 0840   BP: 119/72   BP Location: Right arm   Patient Position: Sitting   Pulse: 95   Temp: 97.8 °F (36.6 °C)   TempSrc: Temporal   Weight: 87.1 kg (192 lb)       PHYSICAL EXAM   GENERAL: Not in acute distress, AAOx3, pleasant  CARDIO: regular rate and rhythm  PULM: symmetric chest rise, speaking in complete sentences without difficulty  NEURO: awake, alert and oriented to person, place, and time  ABDOMINAL: No fundal tenderness, no rebound or guarding, gravid  EXTREMITIES: no bilateral lower extremity edema/tenderness  SKIN: Warm, well-perfused      ULTRASOUND   Please view full ultrasound note on Imaging tab in ViewPoint.  Cephalic presentation.  Posterior placenta.  MATTHEW 14, which is normal.    g (48%, AC 32%).  Choroid  plexus cyst appears to be resolved on today's exam.  Normal follow-up anatomy and growth.      ASSESSMENT/COUNSELIN y.o. G1  P  at 20 4 /7 weeks gestation (Estimated Date of Delivery: None noted.)    -Pregnancy  [ X ] stable  [   ] improving [  ] worsening    Diagnoses and all orders for this visit:    1. Factor V Leiden mutation affecting pregnancy (HCC) (Primary)    2. Type 1 diabetes mellitus affecting pregnancy in second trimester, antepartum        -Factor V Leiden heterozygous   Has met with hematology and decision was made to continue prophylactic lovenox due to IVF and Type 1 DM.  I would also recommend postpartum anticoagulation if we are going to anticoagulate her in the antepartum period as postpartum is the highest risk time for a blood clot and postpartum anticoagulation should never be less than antepartum for this reason.  She should be continued for 6 weeks postpartum on Lovenox, especially given her family history of a PE in her father.  She has been released from hematology for this time after her follow up.  Appreciate their input.      Pregestational DM, Type 1  Counseling: I explained to her that patients with pregestational diabetes have a variety of pregnancy related risks that are related to their level of glycemic control.      We discussed goal pre-prandial values below 90 and 1-hour post-prandial values below 120 to optimize her pregnancy outcome as many studies have demonstrated that this is the normal range for pregnant women without diabetes.  She is seeing her endocrinologist and would like to continue managing with them.  I offered our services if she ever wants to discuss further as the pregnancy progresses.       She had questions about  hypoglycemia and I discussed that the NICU has a protocol and that baby's normal blood sugar range is lower than ours during that immediate postpartum period.      We also discussed preeclampsia risk reduction with baby aspirin, which  she has been taking daily.      She has had a normal ECHO.    She is seeing endo weekly and reports good range of BG.  Her average is 100 with SD of 40 which is decent control.      She had questions about delivery and we briefly discussed lowest basal pump setting with immediate transition after delivery to a pre-loaded Postpartum profile on her pump OR a drip, stop after delivery, and start postpartum profile at >100 BG.  I asked her to discuss this with her endocrinologist--I am happy to help in any way I can.      We discussed postpartum profile because of the huge drop in insulin resistance after the placenta comes out is usually 1/3-1/2 of the pump settings at the end of pregnancy.  I also use the pre-pregnancy pump settings if the patient had good control after pregnancy and use these and my calculations to come up with a good plan for postpartum.        CP cyst:  No longer seen today.      Summary of Plan  -Serial growth ultrasounds every 4 weeks with 1-2x per week  fetal surveillance after 32 weeks.  She would like to do at least growths and 1 per week ANFS here.    -Follow with endo per pt request for N7SH--kp are available at the MFM office if she changes her mind  -Goal delivery 37-38 weeks.        Follow-up: Follow up 4 weeks growth.     Thank you for the consult and opportunity to care for this patient.  Please feel free to reach out with any questions or concerns.      I spent 35 minutes caring for this patient on this date of service. This time includes time spent by me in the following activities: preparing for the visit, reviewing tests, obtaining and/or reviewing a separately obtained history, performing a medically appropriate examination and/or evaluation, counseling and educating the patient/family/caregiver and independently interpreting results and communicating that information with the patient/family/caregiver with greater than 50% spent in counseling and coordination of care.      Lawanda Valente MD [facility]  Maternal Fetal Medicine-[facility]  Office: [phone]  [email or web address]

## 2022-10-05 ENCOUNTER — TRANSCRIBE ORDERS (OUTPATIENT)
Dept: ULTRASOUND IMAGING | Facility: HOSPITAL | Age: 32
End: 2022-10-05

## 2022-10-05 ENCOUNTER — HOSPITAL ENCOUNTER (OUTPATIENT)
Dept: ULTRASOUND IMAGING | Facility: HOSPITAL | Age: 32
Discharge: HOME | End: 2022-10-05
Admitting: OBSTETRICS & GYNECOLOGY

## 2022-10-05 ENCOUNTER — OFFICE VISIT (OUTPATIENT)
Dept: OBSTETRICS AND GYNECOLOGY | Facility: CLINIC | Age: 32
End: 2022-10-05

## 2022-10-05 VITALS
SYSTOLIC BLOOD PRESSURE: 119 MMHG | TEMPERATURE: 97.8 F | HEART RATE: 95 BPM | DIASTOLIC BLOOD PRESSURE: 72 MMHG | WEIGHT: 192 LBS | BODY MASS INDEX: 31 KG/M2

## 2022-10-05 DIAGNOSIS — O99.119 FACTOR V LEIDEN MUTATION AFFECTING PREGNANCY: ICD-10-CM

## 2022-10-05 DIAGNOSIS — D68.51 HETEROZYGOUS FACTOR V LEIDEN MUTATION: ICD-10-CM

## 2022-10-05 DIAGNOSIS — O24.012 TYPE 1 DIABETES MELLITUS AFFECTING PREGNANCY IN SECOND TRIMESTER, ANTEPARTUM: Primary | ICD-10-CM

## 2022-10-05 DIAGNOSIS — O99.119 FACTOR V LEIDEN MUTATION AFFECTING PREGNANCY: Primary | ICD-10-CM

## 2022-10-05 DIAGNOSIS — O24.012 TYPE 1 DIABETES MELLITUS AFFECTING PREGNANCY IN SECOND TRIMESTER, ANTEPARTUM: ICD-10-CM

## 2022-10-05 DIAGNOSIS — D68.51 FACTOR V LEIDEN MUTATION AFFECTING PREGNANCY: Primary | ICD-10-CM

## 2022-10-05 DIAGNOSIS — D68.51 FACTOR V LEIDEN MUTATION AFFECTING PREGNANCY: ICD-10-CM

## 2022-10-05 PROCEDURE — 99214 OFFICE O/P EST MOD 30 MIN: CPT | Performed by: OBSTETRICS & GYNECOLOGY

## 2022-10-05 PROCEDURE — 76816 OB US FOLLOW-UP PER FETUS: CPT

## 2022-10-05 PROCEDURE — 76816 OB US FOLLOW-UP PER FETUS: CPT | Performed by: OBSTETRICS & GYNECOLOGY

## 2022-10-05 NOTE — PROGRESS NOTES
Pt reports that she is doing well and denies vaginal bleeding, cramping, contractions or LOF at this time. Reports lower back/tailbone pain. Notes that she recently bought a cushion for her desk at work and notes some relief with that. Reports active fetal movement. Saw Hematology last month who reported that since Pat was being followed by MFM that she could follow up with him as needed. Notes that she saw Endocrinology yesterday where changes were made to her insulin regimen. Reports that she works closely with the PA in the office for close control, monitoring her Dexcom Clarity abhi weekly and makes adjustments as needed. Had OB appointment yesterday with next appointment scheduled for 10/28.     Vitals:    10/05/22 0840   BP: 119/72   Pulse: 95   Temp: 97.8 °F (36.6 °C)

## 2022-11-02 ENCOUNTER — TRANSCRIBE ORDERS (OUTPATIENT)
Dept: ULTRASOUND IMAGING | Facility: HOSPITAL | Age: 32
End: 2022-11-02

## 2022-11-02 ENCOUNTER — OFFICE VISIT (OUTPATIENT)
Dept: OBSTETRICS AND GYNECOLOGY | Facility: CLINIC | Age: 32
End: 2022-11-02

## 2022-11-02 ENCOUNTER — HOSPITAL ENCOUNTER (OUTPATIENT)
Dept: ULTRASOUND IMAGING | Facility: HOSPITAL | Age: 32
Discharge: HOME | End: 2022-11-02
Admitting: OBSTETRICS & GYNECOLOGY

## 2022-11-02 VITALS
BODY MASS INDEX: 32.07 KG/M2 | SYSTOLIC BLOOD PRESSURE: 124 MMHG | HEART RATE: 92 BPM | TEMPERATURE: 97.1 F | DIASTOLIC BLOOD PRESSURE: 84 MMHG | WEIGHT: 198.6 LBS

## 2022-11-02 DIAGNOSIS — O24.012 TYPE 1 DIABETES MELLITUS AFFECTING PREGNANCY IN SECOND TRIMESTER, ANTEPARTUM: Primary | ICD-10-CM

## 2022-11-02 DIAGNOSIS — D68.51 FACTOR V LEIDEN MUTATION AFFECTING PREGNANCY: ICD-10-CM

## 2022-11-02 DIAGNOSIS — O99.119 FACTOR V LEIDEN MUTATION AFFECTING PREGNANCY: ICD-10-CM

## 2022-11-02 DIAGNOSIS — E10.9 COMPREHENSIVE DIABETIC FOOT EXAMINATION, TYPE 1 DM, ENCOUNTER FOR: ICD-10-CM

## 2022-11-02 DIAGNOSIS — O24.012 TYPE 1 DIABETES MELLITUS AFFECTING PREGNANCY IN SECOND TRIMESTER, ANTEPARTUM: ICD-10-CM

## 2022-11-02 PROCEDURE — 99214 OFFICE O/P EST MOD 30 MIN: CPT | Performed by: OBSTETRICS & GYNECOLOGY

## 2022-11-02 PROCEDURE — 76816 OB US FOLLOW-UP PER FETUS: CPT

## 2022-11-02 PROCEDURE — 76816 OB US FOLLOW-UP PER FETUS: CPT | Performed by: OBSTETRICS & GYNECOLOGY

## 2022-11-02 RX ORDER — FAMOTIDINE 20 MG/1
20 TABLET, FILM COATED ORAL DAILY
Qty: 30 TABLET | Refills: 5 | Status: SHIPPED | OUTPATIENT
Start: 2022-11-02 | End: 2023-11-02

## 2022-11-02 RX ORDER — CALCIUM CARBONATE 200(500)MG
2 TABLET,CHEWABLE ORAL 2 TIMES DAILY
COMMUNITY

## 2022-11-20 NOTE — PROGRESS NOTES
MATERNAL FETAL MEDICINE Consult Note    Dear Dr Melissa Leonard MD:    Thank you for your kind referral of Pat Finley.  As you know, she is a 32 y.o. G1  P  at 24 4 /7 weeks gestation (Estimated Date of Delivery: None noted.). This is a consult    Her antepartum course is complicated by:  Type 1 DM  Factor V Leiden Heterozygous with FH PE    Aneuploidy Screening: low risk panorama male      HPI: Today, she denies headache, blurry vision, RUQ pain. No vaginal bleeding, no contractions.     Review of History:  Past Medical History:   Diagnosis Date   • Diabetes type I (HCC)     [facility]- Dr. Win and Anita MA   • Heterozygous factor V Leiden mutation (HCC)    •  product of IVF pregnancy      Past Surgical History:   Procedure Laterality Date   • EYE SURGERY      Laser eye treatment-diabetic retinopathy       Social History     Socioeconomic History   • Marital status:    Tobacco Use   • Smoking status: Never   • Smokeless tobacco: Never   Vaping Use   • Vaping Use: Never used   Substance and Sexual Activity   • Alcohol use: Not Currently   • Drug use: Never   • Sexual activity: Yes     Partners: Male     Family History   Problem Relation Age of Onset   • Factor V Leiden deficiency Father    • Pulmonary embolism Father    • Diabetes Other         Maternal      Allergies   Allergen Reactions   • Adhesive Tape Rash     Very sensitive      Current Outpatient Medications on File Prior to Visit   Medication Sig Dispense Refill   • aspirin (aspirin) 81 MG EC tablet Take 1 tablet by mouth Daily. 90 tablet 4   • calcium carbonate (TUMS) 500 MG chewable tablet Chew 2 tablets 2 (Two) Times a Day.     • Continuous Blood Gluc Sensor (Dexcom G6 Sensor) APPLY 1 SENSOR EVERY 10 DAYS     • Continuous Blood Gluc Transmit (Dexcom G6 Transmitter) misc See Admin Instructions.     • Enoxaparin Sodium (LOVENOX) 40 MG/0.4ML solution prefilled syringe syringe INJECT 0.4 MILLITER  DAILY     • famotidine (Pepcid) 20 MG tablet Take 1 tablet by mouth Daily. 30 tablet 5   • FIBER PO Take 2 capsules by mouth.     • Prenatal Vit-Fe Fumarate-FA (PRENATAL VITAMIN PO) Take  by mouth.     • aspirin 81 MG EC tablet Take 81 mg by mouth Daily.     • Enoxaparin Sodium (LOVENOX) 30 MG/0.3ML solution prefilled syringe syringe ADMINISTER 1 SYRINGE UNDER THE SKIN EVERY DAY AS DIRECTED     • Insulin Glargine (LANTUS SOLOSTAR) 100 UNIT/ML injection pen Inject 20 Units under the skin into the appropriate area as directed. Back up as needed for pump failure     • insulin lispro (humaLOG) 100 UNIT/ML injection Inject  under the skin 3 (Three) Times a Day Before Meals.     • Insulin Lispro (humaLOG) 100 UNIT/ML injection USE 50 UNITS IN PUMP AS DIRECTED     • Loratadine (CLARITIN PO) Take  by mouth.       No current facility-administered medications on file prior to visit.        Past obstetric, gynecological, medical, surgical, family and social history reviewed.  Relevant lab work and imaging reviewed.    Review of systems  Constitutional:  denies fever, chills, malaise.   ENT/Mouth:  denies sore throat, tinnitis  Eyes: denies vision changes/pain  CV:  denies chest pain  Respiratory:  denies cough/SOB  GI:  denies N/V, diarrhea, abdominal pain.    :   denies dysuria  Skin:  denies lesions or pruritis   Neuro:  denies weakness, focal neurologic symptoms    Vitals:    11/21/22 1108   BP: 105/70   BP Location: Right arm   Patient Position: Sitting   Pulse: 106   Temp: 98.1 °F (36.7 °C)   TempSrc: Oral   Weight: 91.6 kg (202 lb)       PHYSICAL EXAM   GENERAL: Not in acute distress, AAOx3, pleasant  CARDIO: regular rate and rhythm  PULM: symmetric chest rise, speaking in complete sentences without difficulty  NEURO: awake, alert and oriented to person, place, and time  ABDOMINAL: No fundal tenderness, no rebound or guarding, gravid  EXTREMITIES: no bilateral lower extremity edema/tenderness  SKIN: Warm,  well-perfused      ULTRASOUND   Please view full ultrasound note on Imaging tab in ViewPoint.  Cephalic presentation.  Left lateral placenta.  MATTHEW 11.3 cm, which is normal.  BPP     ASSESSMENT/COUNSELIN y.o. G1  P  at 31 2/7 weeks gestation (Estimated Date of Delivery: None noted.)    -Pregnancy  [ X ] stable  [   ] improving [  ] worsening    Diagnoses and all orders for this visit:    1. Type 1 diabetes mellitus affecting pregnancy in second trimester, antepartum (Primary)    2. Factor V Leiden mutation affecting pregnancy (HCC)        -Factor V Leiden heterozygous  She is taking her Lovenox.      Pregestational DM, Type 1  Counseling: I explained to her that patients with pregestational diabetes have a variety of pregnancy related risks that are related to their level of glycemic control.      We discussed goal pre-prandial values below 90 and 1-hour post-prandial values below 120 to optimize her pregnancy outcome as many studies have demonstrated that this is the normal range for pregnant women without diabetes.  She is seeing her endocrinologist and would like to continue managing with them.  I offered our services if she ever wants to discuss further as the pregnancy progresses.       We also discussed preeclampsia risk reduction with baby aspirin, which she has been taking daily.      She has had a normal ECHO.    She is seeing endo weekly and reports good range of BG.  Her 7 day average is 95 with SD of 38 of  which is decent control.      She has her prepregnancy pump settings loaded for postpartum.    We discussed  testing and splitting between my office and her primary OB--she asked to do all her testing here, so we have set her up for Tuesday/Friday US and NSTs starting at 32 weeks.      CP cyst:  No longer seen today.      Summary of Plan  -Serial growth ultrasounds every 4 weeks with 2x per week  fetal surveillance after 32 weeks--she wants to do NST here on  and BPP  on  or vice versa.   -Follow with endo per pt request for E0QW--pu are available at the [facility] office if she changes her mind  -Goal delivery 37-38 weeks.        Follow-up: Twice weekly for  testing given type 1 DM    Thank you for the consult and opportunity to care for this patient.  Please feel free to reach out with any questions or concerns.      I spent 25 minutes caring for this patient on this date of service. This time includes time spent by me in the following activities: preparing for the visit, reviewing tests, obtaining and/or reviewing a separately obtained history, performing a medically appropriate examination and/or evaluation, counseling and educating the patient/family/caregiver and independently interpreting results and communicating that information with the patient/family/caregiver with greater than 50% spent in counseling and coordination of care.     Lawanda Valente MD FACOG  Maternal Fetal Medicine-[facility]  Office: [phone]  [email or web address]

## 2022-11-21 ENCOUNTER — OFFICE VISIT (OUTPATIENT)
Dept: OBSTETRICS AND GYNECOLOGY | Facility: CLINIC | Age: 32
End: 2022-11-21

## 2022-11-21 ENCOUNTER — HOSPITAL ENCOUNTER (OUTPATIENT)
Dept: ULTRASOUND IMAGING | Facility: HOSPITAL | Age: 32
Discharge: HOME | End: 2022-11-21
Admitting: OBSTETRICS & GYNECOLOGY

## 2022-11-21 VITALS
SYSTOLIC BLOOD PRESSURE: 105 MMHG | BODY MASS INDEX: 32.62 KG/M2 | DIASTOLIC BLOOD PRESSURE: 70 MMHG | HEART RATE: 106 BPM | WEIGHT: 202 LBS | TEMPERATURE: 98.1 F

## 2022-11-21 DIAGNOSIS — E10.9 COMPREHENSIVE DIABETIC FOOT EXAMINATION, TYPE 1 DM, ENCOUNTER FOR: ICD-10-CM

## 2022-11-21 DIAGNOSIS — D68.51 FACTOR V LEIDEN MUTATION AFFECTING PREGNANCY: ICD-10-CM

## 2022-11-21 DIAGNOSIS — O99.119 FACTOR V LEIDEN MUTATION AFFECTING PREGNANCY: ICD-10-CM

## 2022-11-21 DIAGNOSIS — O24.012 TYPE 1 DIABETES MELLITUS AFFECTING PREGNANCY IN SECOND TRIMESTER, ANTEPARTUM: Primary | ICD-10-CM

## 2022-11-21 PROCEDURE — 99213 OFFICE O/P EST LOW 20 MIN: CPT | Performed by: OBSTETRICS & GYNECOLOGY

## 2022-11-21 PROCEDURE — 76819 FETAL BIOPHYS PROFIL W/O NST: CPT

## 2022-11-21 PROCEDURE — 76819 FETAL BIOPHYS PROFIL W/O NST: CPT | Performed by: OBSTETRICS & GYNECOLOGY

## 2022-11-21 NOTE — PROGRESS NOTES
Pt reports that she is doing well and denies vaginal bleeding, cramping, contractions or LOF at this time. Reports active fetal movement. Denies HA, visual changes or epigastric pain. Denies any additional complaints at time of appointment. Next OB appointment scheduled for next week.   Last adjustments to insulin made by Endo on 11/17. They will review blood sugars again on 11/28 unless needed sooner.     Vitals:    11/21/22 1108   BP: 105/70   Pulse: 106   Temp: 98.1 °F (36.7 °C)

## 2022-11-23 ENCOUNTER — APPOINTMENT (OUTPATIENT)
Dept: ULTRASOUND IMAGING | Facility: HOSPITAL | Age: 32
End: 2022-11-23

## 2022-11-23 ENCOUNTER — OFFICE VISIT (OUTPATIENT)
Dept: OBSTETRICS AND GYNECOLOGY | Facility: CLINIC | Age: 32
End: 2022-11-23

## 2022-11-23 VITALS
BODY MASS INDEX: 32.62 KG/M2 | DIASTOLIC BLOOD PRESSURE: 79 MMHG | HEART RATE: 111 BPM | TEMPERATURE: 97.9 F | WEIGHT: 202 LBS | SYSTOLIC BLOOD PRESSURE: 125 MMHG

## 2022-11-23 DIAGNOSIS — O99.119 FACTOR V LEIDEN MUTATION AFFECTING PREGNANCY: ICD-10-CM

## 2022-11-23 DIAGNOSIS — O24.012 TYPE 1 DIABETES MELLITUS AFFECTING PREGNANCY IN SECOND TRIMESTER, ANTEPARTUM: Primary | ICD-10-CM

## 2022-11-23 DIAGNOSIS — D68.51 FACTOR V LEIDEN MUTATION AFFECTING PREGNANCY: ICD-10-CM

## 2022-11-23 PROCEDURE — 99212 OFFICE O/P EST SF 10 MIN: CPT | Performed by: OBSTETRICS & GYNECOLOGY

## 2022-11-23 NOTE — PROGRESS NOTES
MATERNAL FETAL MEDICINE NST Note    Dear Dr Melissa Leonard MD:    Thank you for your kind referral of Pat Finley.  As you know, she is a 32 y.o. G1  P  at 31 4 /7 weeks gestation (Estimated Date of Delivery: None noted.). This is a follow up    Her antepartum course is complicated by:  Type 1 DM  Factor V Leiden Heterozygous with FH PE    Aneuploidy Screening: low risk panorama male      HPI: Today, she denies headache, blurry vision, RUQ pain. No vaginal bleeding, no contractions.     Review of History:  Past Medical History:   Diagnosis Date   • Diabetes type I (HCC)     [facility]- Dr. Win and Anita MA   • Heterozygous factor V Leiden mutation (HCC)    • [place] product of IVF pregnancy      Past Surgical History:   Procedure Laterality Date   • EYE SURGERY      Laser eye treatment-diabetic retinopathy       Social History     Socioeconomic History   • Marital status:    Tobacco Use   • Smoking status: Never   • Smokeless tobacco: Never   Vaping Use   • Vaping Use: Never used   Substance and Sexual Activity   • Alcohol use: Not Currently   • Drug use: Never   • Sexual activity: Yes     Partners: Male     Family History   Problem Relation Age of Onset   • Factor V Leiden deficiency Father    • Pulmonary embolism Father    • Diabetes Other         Maternal      Allergies   Allergen Reactions   • Adhesive Tape Rash     Very sensitive      Current Outpatient Medications on File Prior to Visit   Medication Sig Dispense Refill   • aspirin (aspirin) 81 MG EC tablet Take 1 tablet by mouth Daily. 90 tablet 4   • aspirin 81 MG EC tablet Take 81 mg by mouth Daily.     • calcium carbonate (TUMS) 500 MG chewable tablet Chew 2 tablets 2 (Two) Times a Day.     • Continuous Blood Gluc Sensor (Dexcom G6 Sensor) APPLY 1 SENSOR EVERY 10 DAYS     • Continuous Blood Gluc Transmit (Dexcom G6 Transmitter) misc See Admin Instructions.     • Enoxaparin Sodium (LOVENOX) 30 MG/0.3ML  solution prefilled syringe syringe ADMINISTER 1 SYRINGE UNDER THE SKIN EVERY DAY AS DIRECTED     • Enoxaparin Sodium (LOVENOX) 40 MG/0.4ML solution prefilled syringe syringe INJECT 0.4 MILLITER DAILY     • famotidine (Pepcid) 20 MG tablet Take 1 tablet by mouth Daily. 30 tablet 5   • FIBER PO Take 2 capsules by mouth.     • Insulin Glargine (LANTUS SOLOSTAR) 100 UNIT/ML injection pen Inject 20 Units under the skin into the appropriate area as directed. Back up as needed for pump failure     • insulin lispro (humaLOG) 100 UNIT/ML injection Inject  under the skin 3 (Three) Times a Day Before Meals.     • Insulin Lispro (humaLOG) 100 UNIT/ML injection USE 50 UNITS IN PUMP AS DIRECTED     • Loratadine (CLARITIN PO) Take  by mouth.     • Prenatal Vit-Fe Fumarate-FA (PRENATAL VITAMIN PO) Take  by mouth.       No current facility-administered medications on file prior to visit.        Past obstetric, gynecological, medical, surgical, family and social history reviewed.  Relevant lab work and imaging reviewed.    Review of systems  Constitutional:  denies fever, chills, malaise.   ENT/Mouth:  denies sore throat, tinnitis  Eyes: denies vision changes/pain  CV:  denies chest pain  Respiratory:  denies cough/SOB  GI:  denies N/V, diarrhea, abdominal pain.    :   denies dysuria  Skin:  denies lesions or pruritis   Neuro:  denies weakness, focal neurologic symptoms    Vitals:    22 1314   BP: 125/79   BP Location: Right arm   Patient Position: Sitting   Pulse: 111   Temp: 97.9 °F (36.6 °C)   TempSrc: Oral   Weight: 91.6 kg (202 lb)           NST:  140/mod/+acc/no decels.  Baseline somewhat wondering, but average of 140 with large accelerations and no decelerations.    Maybe 1 ctx in 30 min, not felt.    Cat 1    ASSESSMENT/COUNSELIN y.o. G1  P  at 31 2/7 weeks gestation (Estimated Date of Delivery: None noted.)    -Pregnancy  [ X ] stable  [   ] improving [  ] worsening    Diagnoses and all orders for this  visit:    1. Type 1 diabetes mellitus affecting pregnancy in second trimester, antepartum (Primary)    2. Factor V Leiden mutation affecting pregnancy (HCC)        -Factor V Leiden heterozygous  She is taking her Lovenox.      Pregestational DM, Type 1  NST reactive      Summary of Plan  -Serial growth ultrasounds every 4 weeks with 2x per week  fetal surveillance after 32 weeks--she wants to do NST here on  and BPP on  or vice versa.   -Follow with endo per pt request for [id number] are available at the [facility] office if she changes her mind  -Goal delivery 37-38 weeks.        Follow-up: Twice weekly for  testing given type 1 DM    Thank you for the consult and opportunity to care for this patient.  Please feel free to reach out with any questions or concerns.      I spent 10 minutes caring for this patient on this date of service. This time includes time spent by me in the following activities: preparing for the visit, reviewing tests, obtaining and/or reviewing a separately obtained history, performing a medically appropriate examination and/or evaluation, counseling and educating the patient/family/caregiver and independently interpreting results and communicating that information with the patient/family/caregiver with greater than 50% spent in counseling and coordination of care.     Lawanda Valente MD [facility] [place]  Maternal Fetal Medicine-[facility]  Office: [phone]  [email or web address]

## 2022-11-23 NOTE — PROGRESS NOTES
Pt reports that she is doing well and denies vaginal bleeding, cramping, contractions or LOF at this time. Reports very active fetal movement. Denies HA, visual changes or epigastric pain. Next OB appointment scheduled for next week.   Pt here for NST. Started at 1:20    Vitals:    11/23/22 1314   BP: 125/79   Pulse: 111   Temp: 97.9 °F (36.6 °C)

## 2022-11-25 NOTE — PROGRESS NOTES
MATERNAL FETAL MEDICINE Follow UP Note    Dear Dr Melissa Leonard MD:    Thank you for your kind referral of Pat Finley.  As you know, she is a 32 y.o. G1  P  at 32 3 /7 weeks gestation (Estimated Date of Delivery: None noted.). This is a follow up.      Her antepartum course is complicated by:  Type 1 DM  Factor V Leiden Heterozygous with FH PE    Aneuploidy Screening: low risk panorama male      HPI: Today, she denies headache, blurry vision, RUQ pain. No vaginal bleeding, no contractions.     Review of History:  Past Medical History:   Diagnosis Date   • Diabetes type I (HCC)     [facility]- Dr. Win and Anita MA   • Heterozygous factor V Leiden mutation (HCC)    • [place] product of IVF pregnancy      Past Surgical History:   Procedure Laterality Date   • EYE SURGERY      Laser eye treatment-diabetic retinopathy       Social History     Socioeconomic History   • Marital status:    Tobacco Use   • Smoking status: Never   • Smokeless tobacco: Never   Vaping Use   • Vaping Use: Never used   Substance and Sexual Activity   • Alcohol use: Not Currently   • Drug use: Never   • Sexual activity: Yes     Partners: Male     Family History   Problem Relation Age of Onset   • Factor V Leiden deficiency Father    • Pulmonary embolism Father    • Diabetes Other         Maternal      Allergies   Allergen Reactions   • Adhesive Tape Rash     Very sensitive      Current Outpatient Medications on File Prior to Visit   Medication Sig Dispense Refill   • aspirin (aspirin) 81 MG EC tablet Take 1 tablet by mouth Daily. 90 tablet 4   • Continuous Blood Gluc Sensor (Dexcom G6 Sensor) APPLY 1 SENSOR EVERY 10 DAYS     • Continuous Blood Gluc Transmit (Dexcom G6 Transmitter) misc See Admin Instructions.     • Enoxaparin Sodium (LOVENOX) 40 MG/0.4ML solution prefilled syringe syringe INJECT 0.4 MILLITER DAILY     • FIBER PO Take 2 capsules by mouth.     • Insulin Glargine (LANTUS  SOLOSTAR) 100 UNIT/ML injection pen Inject 20 Units under the skin into the appropriate area as directed. Back up as needed for pump failure     • insulin lispro (humaLOG) 100 UNIT/ML injection Inject  under the skin 3 (Three) Times a Day Before Meals.     • Insulin Lispro (humaLOG) 100 UNIT/ML injection USE 50 UNITS IN PUMP AS DIRECTED     • Prenatal Vit-Fe Fumarate-FA (PRENATAL VITAMIN PO) Take  by mouth.     • aspirin 81 MG EC tablet Take 81 mg by mouth Daily.     • calcium carbonate (TUMS) 500 MG chewable tablet Chew 2 tablets 2 (Two) Times a Day.     • Enoxaparin Sodium (LOVENOX) 30 MG/0.3ML solution prefilled syringe syringe ADMINISTER 1 SYRINGE UNDER THE SKIN EVERY DAY AS DIRECTED     • famotidine (Pepcid) 20 MG tablet Take 1 tablet by mouth Daily. 30 tablet 5   • Loratadine (CLARITIN PO) Take  by mouth.       No current facility-administered medications on file prior to visit.        Past obstetric, gynecological, medical, surgical, family and social history reviewed.  Relevant lab work and imaging reviewed.    Review of systems  Constitutional:  denies fever, chills, malaise.   ENT/Mouth:  denies sore throat, tinnitis  Eyes: denies vision changes/pain  CV:  denies chest pain  Respiratory:  denies cough/SOB  GI:  denies N/V, diarrhea, abdominal pain.    :   denies dysuria  Skin:  denies lesions or pruritis   Neuro:  denies weakness, focal neurologic symptoms    Vitals:    11/29/22 0913   BP: 126/82   BP Location: Right arm   Patient Position: Sitting   Pulse: 107   Temp: 98.4 °F (36.9 °C)   TempSrc: Oral   Weight: 92.1 kg (203 lb)       PHYSICAL EXAM   GENERAL: Not in acute distress, AAOx3, pleasant  CARDIO: regular rate and rhythm  PULM: symmetric chest rise, speaking in complete sentences without difficulty  NEURO: awake, alert and oriented to person, place, and time  ABDOMINAL: No fundal tenderness, no rebound or guarding, gravid  EXTREMITIES: no bilateral lower extremity edema/tenderness  SKIN: Warm,  well-perfused      ULTRASOUND   Please view full ultrasound note on Imaging tab in ViewPoint.  Cephalic presentation.  Posterior placenta.  MATTHEW 10.0 cm, which is normal.  EFW 1979 g (47% AC 20%)   BPP     ASSESSMENT/COUNSELIN y.o. G1  P  at 32 3 /7 weeks gestation (Estimated Date of Delivery: None noted.).    -Pregnancy  [ X ] stable  [   ] improving [  ] worsening    Diagnoses and all orders for this visit:    1. Type 1 diabetes mellitus affecting pregnancy in second trimester, antepartum (Primary)    2. Factor V Leiden mutation affecting pregnancy (HCC)        -Factor V Leiden heterozygous  She is taking her Lovenox.      Pregestational DM, Type 1  She is following with her endocrinologist and doing well.  Fetal testing is reassuring and growth/BPP/fluid is normal.      We also discussed preeclampsia risk reduction with baby aspirin, which she has been taking daily.      She has had a normal ECHO.      Factor V Leiden: on lovenox.      Summary of Plan  -Serial growth ultrasounds every 4 weeks with 2x per week  fetal surveillance after 32 weeks--She wants Tuesday BPP/Thursday NST  -Follow with endo per pt request for [id number] are available at the [facility] office if she changes her mind  -Goal delivery 37-38 weeks.        Follow-up: Twice weekly for  testing given type 1 DM    Thank you for the consult and opportunity to care for this patient.  Please feel free to reach out with any questions or concerns.      I spent 15 minutes caring for this patient on this date of service. This time includes time spent by me in the following activities: preparing for the visit, reviewing tests, obtaining and/or reviewing a separately obtained history, performing a medically appropriate examination and/or evaluation, counseling and educating the patient/family/caregiver and independently interpreting results and communicating that information with the patient/family/caregiver with greater than 50% spent in  counseling and coordination of care.     Lawanda Valente MD [facility]  Maternal Fetal Medicine-[facility]  Office: [phone]  [email or web address]

## 2022-11-29 ENCOUNTER — OFFICE VISIT (OUTPATIENT)
Dept: OBSTETRICS AND GYNECOLOGY | Facility: CLINIC | Age: 32
End: 2022-11-29

## 2022-11-29 ENCOUNTER — HOSPITAL ENCOUNTER (OUTPATIENT)
Dept: ULTRASOUND IMAGING | Facility: HOSPITAL | Age: 32
Discharge: HOME | End: 2022-11-29
Admitting: OBSTETRICS & GYNECOLOGY

## 2022-11-29 VITALS
TEMPERATURE: 98.4 F | DIASTOLIC BLOOD PRESSURE: 82 MMHG | HEART RATE: 107 BPM | SYSTOLIC BLOOD PRESSURE: 126 MMHG | WEIGHT: 203 LBS | BODY MASS INDEX: 32.78 KG/M2

## 2022-11-29 DIAGNOSIS — O99.119 FACTOR V LEIDEN MUTATION AFFECTING PREGNANCY: ICD-10-CM

## 2022-11-29 DIAGNOSIS — O24.012 TYPE 1 DIABETES MELLITUS AFFECTING PREGNANCY IN SECOND TRIMESTER, ANTEPARTUM: ICD-10-CM

## 2022-11-29 DIAGNOSIS — D68.51 FACTOR V LEIDEN MUTATION AFFECTING PREGNANCY: ICD-10-CM

## 2022-11-29 DIAGNOSIS — O24.012 TYPE 1 DIABETES MELLITUS AFFECTING PREGNANCY IN SECOND TRIMESTER, ANTEPARTUM: Primary | ICD-10-CM

## 2022-11-29 PROCEDURE — 76816 OB US FOLLOW-UP PER FETUS: CPT | Performed by: OBSTETRICS & GYNECOLOGY

## 2022-11-29 PROCEDURE — 99213 OFFICE O/P EST LOW 20 MIN: CPT | Performed by: OBSTETRICS & GYNECOLOGY

## 2022-11-29 PROCEDURE — 76819 FETAL BIOPHYS PROFIL W/O NST: CPT

## 2022-11-29 PROCEDURE — 76816 OB US FOLLOW-UP PER FETUS: CPT

## 2022-11-29 PROCEDURE — 76819 FETAL BIOPHYS PROFIL W/O NST: CPT | Performed by: OBSTETRICS & GYNECOLOGY

## 2022-11-29 NOTE — PROGRESS NOTES
Pt reports that she is doing well and denies vaginal bleeding, cramping, contractions or LOF at this time. Reports active fetal movement. Denies HA, visual changes or epigastric pain. Denies any additional complaints at time of appointment. Next OB appointment scheduled for 12/13.  Appointment with Endocrinology scheduled for 12/1.    Vitals:    11/29/22 0913   BP: 126/82   Pulse: 107   Temp: 98.4 °F (36.9 °C)

## 2022-12-01 NOTE — PROGRESS NOTES
MATERNAL FETAL MEDICINE NST Note    Dear Dr Melissa Leonard MD:    Thank you for your kind referral of Pat Finley.  As you know, she is a 32 y.o. G1  P  at 32 6 /7 weeks gestation (Estimated Date of Delivery: None noted.). This is a follow up.      Her antepartum course is complicated by:  Type 1 DM  Factor V Leiden Heterozygous with FH PE    Aneuploidy Screening: low risk panorama male      HPI: Today, she denies headache, blurry vision, RUQ pain. No vaginal bleeding, no contractions.     Review of History:  Past Medical History:   Diagnosis Date   • Diabetes type I (HCC)     [facility]- Dr. Win and Anita MA   • Heterozygous factor V Leiden mutation (HCC)    •  product of IVF pregnancy      Past Surgical History:   Procedure Laterality Date   • EYE SURGERY      Laser eye treatment-diabetic retinopathy       Social History     Socioeconomic History   • Marital status:    Tobacco Use   • Smoking status: Never   • Smokeless tobacco: Never   Vaping Use   • Vaping Use: Never used   Substance and Sexual Activity   • Alcohol use: Not Currently   • Drug use: Never   • Sexual activity: Yes     Partners: Male     Family History   Problem Relation Age of Onset   • Factor V Leiden deficiency Father    • Pulmonary embolism Father    • Diabetes Other         Maternal      Allergies   Allergen Reactions   • Adhesive Tape Rash     Very sensitive      Current Outpatient Medications on File Prior to Visit   Medication Sig Dispense Refill   • aspirin (aspirin) 81 MG EC tablet Take 1 tablet by mouth Daily. 90 tablet 4   • aspirin 81 MG EC tablet Take 81 mg by mouth Daily.     • calcium carbonate (TUMS) 500 MG chewable tablet Chew 2 tablets 2 (Two) Times a Day.     • Continuous Blood Gluc Sensor (Dexcom G6 Sensor) APPLY 1 SENSOR EVERY 10 DAYS     • Continuous Blood Gluc Transmit (Dexcom G6 Transmitter) misc See Admin Instructions.     • Enoxaparin Sodium (LOVENOX) 30  "MG/0.3ML solution prefilled syringe syringe ADMINISTER 1 SYRINGE UNDER THE SKIN EVERY DAY AS DIRECTED     • Enoxaparin Sodium (LOVENOX) 40 MG/0.4ML solution prefilled syringe syringe INJECT 0.4 MILLITER DAILY     • famotidine (Pepcid) 20 MG tablet Take 1 tablet by mouth Daily. 30 tablet 5   • FIBER PO Take 2 capsules by mouth.     • Insulin Glargine (LANTUS SOLOSTAR) 100 UNIT/ML injection pen Inject 20 Units under the skin into the appropriate area as directed. Back up as needed for pump failure     • insulin lispro (humaLOG) 100 UNIT/ML injection Inject  under the skin 3 (Three) Times a Day Before Meals.     • Insulin Lispro (humaLOG) 100 UNIT/ML injection USE 50 UNITS IN PUMP AS DIRECTED     • Loratadine (CLARITIN PO) Take  by mouth.     • Prenatal Vit-Fe Fumarate-FA (PRENATAL VITAMIN PO) Take  by mouth.       No current facility-administered medications on file prior to visit.        Past obstetric, gynecological, medical, surgical, family and social history reviewed.  Relevant lab work and imaging reviewed.    Review of systems  Constitutional:  denies fever, chills, malaise.   ENT/Mouth:  denies sore throat, tinnitis  Eyes: denies vision changes/pain  CV:  denies chest pain  Respiratory:  denies cough/SOB  GI:  denies N/V, diarrhea, abdominal pain.    :   denies dysuria  Skin:  denies lesions or pruritis   Neuro:  denies weakness, focal neurologic symptoms    Vitals:    22 0914   BP: 118/72   BP Location: Right arm   Patient Position: Sitting   Pulse: 87   Temp: 98 °F (36.7 °C)   TempSrc: Oral   Weight: 92.2 kg (203 lb 3.2 oz)   Height: 167.6 cm (66\")     NST:  145/mod/+acc/no decels.    No contractions  Cat 1    ASSESSMENT/COUNSELIN y.o. G1  P  at 32 6/7 weeks gestation (Estimated Date of Delivery: None noted.)    -Pregnancy  [ X ] stable  [   ] improving [  ] worsening    Diagnoses and all orders for this visit:    1. Type 1 diabetes mellitus affecting pregnancy in second trimester, antepartum " (Primary)        Factor V Leiden heterozygous  She is taking her Lovenox.      Pregestational DM, Type 1  NST reactive    Summary of Plan  -Serial growth ultrasounds every 4 weeks with 2x per week  fetal surveillance after 32 weeks--she wants to do NST here on  and BPP on  or vice versa.   -Follow with endo per pt request for [id number]--ep are available at the [facility] office if she changes her mind  -Goal delivery 37-38 weeks.      Follow-up: Twice weekly for  testing given type 1 DM    Thank you for the consult and opportunity to care for this patient.  Please feel free to reach out with any questions or concerns.      I spent 10 minutes caring for this patient on this date of service. This time includes time spent by me in the following activities: preparing for the visit, reviewing tests, obtaining and/or reviewing a separately obtained history, performing a medically appropriate examination and/or evaluation, counseling and educating the patient/family/caregiver and independently interpreting results and communicating that information with the patient/family/caregiver with greater than 50% spent in counseling and coordination of care.     Lawanda Valente MD FACOG  Maternal Fetal Medicine-[facility]  Office: [phone]  [email or web address]

## 2022-12-02 ENCOUNTER — HOSPITAL ENCOUNTER (OUTPATIENT)
Dept: ULTRASOUND IMAGING | Facility: HOSPITAL | Age: 32
Discharge: HOME | End: 2022-12-02

## 2022-12-02 ENCOUNTER — OFFICE VISIT (OUTPATIENT)
Dept: OBSTETRICS AND GYNECOLOGY | Facility: CLINIC | Age: 32
End: 2022-12-02

## 2022-12-02 VITALS
DIASTOLIC BLOOD PRESSURE: 72 MMHG | SYSTOLIC BLOOD PRESSURE: 118 MMHG | WEIGHT: 203.2 LBS | TEMPERATURE: 98 F | BODY MASS INDEX: 32.66 KG/M2 | HEART RATE: 87 BPM | HEIGHT: 66 IN

## 2022-12-02 DIAGNOSIS — O99.119 FACTOR V LEIDEN MUTATION AFFECTING PREGNANCY: ICD-10-CM

## 2022-12-02 DIAGNOSIS — D68.51 FACTOR V LEIDEN MUTATION AFFECTING PREGNANCY: ICD-10-CM

## 2022-12-02 DIAGNOSIS — O24.012 TYPE 1 DIABETES MELLITUS AFFECTING PREGNANCY IN SECOND TRIMESTER, ANTEPARTUM: Primary | ICD-10-CM

## 2022-12-02 DIAGNOSIS — O24.012 TYPE 1 DIABETES MELLITUS AFFECTING PREGNANCY IN SECOND TRIMESTER, ANTEPARTUM: ICD-10-CM

## 2022-12-02 PROCEDURE — 99212 OFFICE O/P EST SF 10 MIN: CPT | Performed by: OBSTETRICS & GYNECOLOGY

## 2022-12-02 NOTE — PROGRESS NOTES
Pt. Reports that she is doing well and denies vaginal bleeding, cramping, contractions or leaking of fluid at this time. Reports active fetal movement.  Denies headache, visual changes or epigastric pain.  Denies any additional complaints at time of appointment.  Next OB appointment scheduled for 12/13/2022    Vitals:    12/02/22 0914   BP: 118/72   Pulse: 87   Temp: 98 °F (36.7 °C)

## 2022-12-05 NOTE — PROGRESS NOTES
MATERNAL FETAL MEDICINE Follow UP Note    Dear Dr Melissa Leonard MD:    Thank you for your kind referral of Pat Finley.  As you know, she is a 32 y.o. G1  P  at 33 3/7 weeks gestation (Estimated Date of Delivery: None noted.). This is a follow up.      Her antepartum course is complicated by:  Type 1 DM  Factor V Leiden Heterozygous with FH PE    Aneuploidy Screening: low risk panorama male      HPI: Today, she denies headache, blurry vision, RUQ pain. No vaginal bleeding, no contractions.     Review of History:  Past Medical History:   Diagnosis Date   • Diabetes type I (HCC)     [facility]- Dr. Win and Anita MA   • Heterozygous factor V Leiden mutation (HCC)    • [place] product of IVF pregnancy      Past Surgical History:   Procedure Laterality Date   • EYE SURGERY      Laser eye treatment-diabetic retinopathy       Social History     Socioeconomic History   • Marital status:    Tobacco Use   • Smoking status: Never   • Smokeless tobacco: Never   Vaping Use   • Vaping Use: Never used   Substance and Sexual Activity   • Alcohol use: Not Currently   • Drug use: Never   • Sexual activity: Yes     Partners: Male     Family History   Problem Relation Age of Onset   • Factor V Leiden deficiency Father    • Pulmonary embolism Father    • Diabetes Other         Maternal      Allergies   Allergen Reactions   • Adhesive Tape Rash     Very sensitive      Current Outpatient Medications on File Prior to Visit   Medication Sig Dispense Refill   • aspirin (aspirin) 81 MG EC tablet Take 1 tablet by mouth Daily. 90 tablet 4   • aspirin 81 MG EC tablet Take 81 mg by mouth Daily.     • calcium carbonate (TUMS) 500 MG chewable tablet Chew 2 tablets 2 (Two) Times a Day.     • Continuous Blood Gluc Sensor (Dexcom G6 Sensor) APPLY 1 SENSOR EVERY 10 DAYS     • Continuous Blood Gluc Transmit (Dexcom G6 Transmitter) misc See Admin Instructions.     • Enoxaparin Sodium (LOVENOX) 40  MG/0.4ML solution prefilled syringe syringe INJECT 0.4 MILLITER DAILY     • FIBER PO Take 2 capsules by mouth.     • Insulin Glargine (LANTUS SOLOSTAR) 100 UNIT/ML injection pen Inject 20 Units under the skin into the appropriate area as directed. Back up as needed for pump failure     • insulin lispro (humaLOG) 100 UNIT/ML injection Inject  under the skin 3 (Three) Times a Day Before Meals.     • Insulin Lispro (humaLOG) 100 UNIT/ML injection USE 50 UNITS IN PUMP AS DIRECTED     • Loratadine (CLARITIN PO) Take  by mouth.     • Enoxaparin Sodium (LOVENOX) 30 MG/0.3ML solution prefilled syringe syringe ADMINISTER 1 SYRINGE UNDER THE SKIN EVERY DAY AS DIRECTED     • famotidine (Pepcid) 20 MG tablet Take 1 tablet by mouth Daily. 30 tablet 5   • Prenatal Vit-Fe Fumarate-FA (PRENATAL VITAMIN PO) Take  by mouth.       No current facility-administered medications on file prior to visit.        Past obstetric, gynecological, medical, surgical, family and social history reviewed.  Relevant lab work and imaging reviewed.    Review of systems  Constitutional:  denies fever, chills, malaise.   ENT/Mouth:  denies sore throat, tinnitis  Eyes: denies vision changes/pain  CV:  denies chest pain  Respiratory:  denies cough/SOB  GI:  denies N/V, diarrhea, abdominal pain.    :   denies dysuria  Skin:  denies lesions or pruritis   Neuro:  denies weakness, focal neurologic symptoms    Vitals:    12/06/22 0828   BP: 113/79   BP Location: Right arm   Patient Position: Sitting   Pulse: 87   Temp: 97.9 °F (36.6 °C)   TempSrc: Oral   Weight: 92.6 kg (204 lb 3.2 oz)       PHYSICAL EXAM   GENERAL: Not in acute distress, AAOx3, pleasant  CARDIO: regular rate and rhythm  PULM: symmetric chest rise, speaking in complete sentences without difficulty  NEURO: awake, alert and oriented to person, place, and time  ABDOMINAL: No fundal tenderness, no rebound or guarding, gravid  EXTREMITIES: no bilateral lower extremity edema/tenderness  SKIN: Warm,  well-perfused      ULTRASOUND   Please view full ultrasound note on Imaging tab in ViewPoint.  Cephalic presentation.  Posterior placenta.  MATTHEW 10.3 cm, which is normal.  BPP 8/8       ASSESSMENT/COUNSELIN y.o. G1  P  at 33 3 /7 weeks gestation (Estimated Date of Delivery: None noted.).    -Pregnancy  [ X ] stable  [   ] improving [  ] worsening    Diagnoses and all orders for this visit:    1. Type 1 diabetes mellitus affecting pregnancy in second trimester, antepartum (Primary)    2. Factor V Leiden mutation affecting pregnancy (HCC)        -Factor V Leiden heterozygous  She is taking her Lovenox.      Pregestational DM, Type 1  She is following with her endocrinologist and doing well. Pt reports her latest A1C is up to 5.0 from 4.7.  Encouraged the pt to continue the hard work.  Fetal testing is reassuring and growth/BPP/fluid is normal.      We also discussed preeclampsia risk reduction with baby aspirin, which she has been taking daily.      She has had a normal ECHO.    All questions and concerns answered.    Factor V Leiden: on lovenox.      Summary of Plan  -Serial growth ultrasounds every 4 weeks with 2x per week  fetal surveillance after 32 weeks--She wants Tuesday BPP/Thursday NST  -Follow with endo per pt request for [id number]--lf are available at the [facility] office if she changes her mind  -Goal delivery 37-38 weeks.        Follow-up: Twice weekly for  testing given type 1 DM    Thank you for the consult and opportunity to care for this patient.  Please feel free to reach out with any questions or concerns.      I spent 15 minutes caring for this patient on this date of service. This time includes time spent by me in the following activities: preparing for the visit, reviewing tests, obtaining and/or reviewing a separately obtained history, performing a medically appropriate examination and/or evaluation, counseling and educating the patient/family/caregiver and independently  interpreting results and communicating that information with the patient/family/caregiver with greater than 50% spent in counseling and coordination of care.     ABIOLA Montes, FNP-BC  Maternal Fetal Medicine-[address], [address]  [place] [address]  Office: [phone]

## 2022-12-06 ENCOUNTER — HOSPITAL ENCOUNTER (OUTPATIENT)
Dept: ULTRASOUND IMAGING | Facility: HOSPITAL | Age: 32
Discharge: HOME | End: 2022-12-06
Admitting: OBSTETRICS & GYNECOLOGY

## 2022-12-06 ENCOUNTER — APPOINTMENT (OUTPATIENT)
Dept: ULTRASOUND IMAGING | Facility: HOSPITAL | Age: 32
End: 2022-12-06

## 2022-12-06 ENCOUNTER — OFFICE VISIT (OUTPATIENT)
Dept: OBSTETRICS AND GYNECOLOGY | Facility: CLINIC | Age: 32
End: 2022-12-06

## 2022-12-06 VITALS
HEART RATE: 87 BPM | DIASTOLIC BLOOD PRESSURE: 79 MMHG | WEIGHT: 204.2 LBS | SYSTOLIC BLOOD PRESSURE: 113 MMHG | TEMPERATURE: 97.9 F | BODY MASS INDEX: 32.96 KG/M2

## 2022-12-06 DIAGNOSIS — E10.9 COMPREHENSIVE DIABETIC FOOT EXAMINATION, TYPE 1 DM, ENCOUNTER FOR: ICD-10-CM

## 2022-12-06 DIAGNOSIS — D68.51 FACTOR V LEIDEN MUTATION AFFECTING PREGNANCY: ICD-10-CM

## 2022-12-06 DIAGNOSIS — O99.119 FACTOR V LEIDEN MUTATION AFFECTING PREGNANCY: ICD-10-CM

## 2022-12-06 DIAGNOSIS — O24.012 TYPE 1 DIABETES MELLITUS AFFECTING PREGNANCY IN SECOND TRIMESTER, ANTEPARTUM: Primary | ICD-10-CM

## 2022-12-06 PROCEDURE — 76819 FETAL BIOPHYS PROFIL W/O NST: CPT

## 2022-12-06 PROCEDURE — 76819 FETAL BIOPHYS PROFIL W/O NST: CPT | Performed by: OBSTETRICS & GYNECOLOGY

## 2022-12-06 PROCEDURE — 99213 OFFICE O/P EST LOW 20 MIN: CPT | Performed by: OBSTETRICS & GYNECOLOGY

## 2022-12-09 ENCOUNTER — OFFICE VISIT (OUTPATIENT)
Dept: OBSTETRICS AND GYNECOLOGY | Facility: CLINIC | Age: 32
End: 2022-12-09

## 2022-12-09 ENCOUNTER — APPOINTMENT (OUTPATIENT)
Dept: ULTRASOUND IMAGING | Facility: HOSPITAL | Age: 32
End: 2022-12-09

## 2022-12-09 VITALS
BODY MASS INDEX: 33.09 KG/M2 | SYSTOLIC BLOOD PRESSURE: 110 MMHG | WEIGHT: 205 LBS | DIASTOLIC BLOOD PRESSURE: 77 MMHG | HEART RATE: 92 BPM | TEMPERATURE: 98 F

## 2022-12-09 DIAGNOSIS — O24.012 TYPE 1 DIABETES MELLITUS AFFECTING PREGNANCY IN SECOND TRIMESTER, ANTEPARTUM: Primary | ICD-10-CM

## 2022-12-09 PROCEDURE — 99212 OFFICE O/P EST SF 10 MIN: CPT | Performed by: OBSTETRICS & GYNECOLOGY

## 2022-12-09 PROCEDURE — 59025 FETAL NON-STRESS TEST: CPT | Performed by: OBSTETRICS & GYNECOLOGY

## 2022-12-09 NOTE — PROGRESS NOTES
MATERNAL FETAL MEDICINE NST Note    Dear Dr Melissa Leonard MD:    Thank you for your kind referral of Pat Finley.  As you know, she is a 32 y.o. G1  P  at 32 6 /7 weeks gestation (Estimated Date of Delivery: None noted.). This is a follow up.      Her antepartum course is complicated by:  Type 1 DM  Factor V Leiden Heterozygous with FH PE    Aneuploidy Screening: low risk panorama male      HPI: Today, she denies headache, blurry vision, RUQ pain. No vaginal bleeding, no contractions.     Review of History:  Past Medical History:   Diagnosis Date   • Diabetes type I (HCC)     [facility]- Dr. Win and Anita MA   • Heterozygous factor V Leiden mutation (HCC)    •  product of IVF pregnancy      Past Surgical History:   Procedure Laterality Date   • EYE SURGERY      Laser eye treatment-diabetic retinopathy       Social History     Socioeconomic History   • Marital status:    Tobacco Use   • Smoking status: Never   • Smokeless tobacco: Never   Vaping Use   • Vaping Use: Never used   Substance and Sexual Activity   • Alcohol use: Not Currently   • Drug use: Never   • Sexual activity: Yes     Partners: Male     Family History   Problem Relation Age of Onset   • Factor V Leiden deficiency Father    • Pulmonary embolism Father    • Diabetes Other         Maternal      Allergies   Allergen Reactions   • Adhesive Tape Rash     Very sensitive      Current Outpatient Medications on File Prior to Visit   Medication Sig Dispense Refill   • aspirin (aspirin) 81 MG EC tablet Take 1 tablet by mouth Daily. 90 tablet 4   • aspirin 81 MG EC tablet Take 81 mg by mouth Daily.     • calcium carbonate (TUMS) 500 MG chewable tablet Chew 2 tablets 2 (Two) Times a Day.     • Continuous Blood Gluc Sensor (Dexcom G6 Sensor) APPLY 1 SENSOR EVERY 10 DAYS     • Continuous Blood Gluc Transmit (Dexcom G6 Transmitter) misc See Admin Instructions.     • Enoxaparin Sodium (LOVENOX) 30  MG/0.3ML solution prefilled syringe syringe ADMINISTER 1 SYRINGE UNDER THE SKIN EVERY DAY AS DIRECTED     • Enoxaparin Sodium (LOVENOX) 40 MG/0.4ML solution prefilled syringe syringe INJECT 0.4 MILLITER DAILY     • famotidine (Pepcid) 20 MG tablet Take 1 tablet by mouth Daily. 30 tablet 5   • FIBER PO Take 2 capsules by mouth.     • Insulin Glargine (LANTUS SOLOSTAR) 100 UNIT/ML injection pen Inject 20 Units under the skin into the appropriate area as directed. Back up as needed for pump failure     • insulin lispro (humaLOG) 100 UNIT/ML injection Inject  under the skin 3 (Three) Times a Day Before Meals.     • Insulin Lispro (humaLOG) 100 UNIT/ML injection USE 50 UNITS IN PUMP AS DIRECTED     • Loratadine (CLARITIN PO) Take  by mouth.     • Prenatal Vit-Fe Fumarate-FA (PRENATAL VITAMIN PO) Take  by mouth.       No current facility-administered medications on file prior to visit.        Past obstetric, gynecological, medical, surgical, family and social history reviewed.  Relevant lab work and imaging reviewed.    Review of systems  Constitutional:  denies fever, chills, malaise.   ENT/Mouth:  denies sore throat, tinnitis  Eyes: denies vision changes/pain  CV:  denies chest pain  Respiratory:  denies cough/SOB  GI:  denies N/V, diarrhea, abdominal pain.    :   denies dysuria  Skin:  denies lesions or pruritis   Neuro:  denies weakness, focal neurologic symptoms    Vitals:    22 0814 22 0816   BP: 132/80 110/77   BP Location: Right arm Left arm   Patient Position: Sitting Sitting   Pulse: 84 92   Temp: 98 °F (36.7 °C)    TempSrc: Temporal    Weight: 93 kg (205 lb)      NST:  125/mod/+acc/no decels.    No contractions, rare irritability  Cat 1    ASSESSMENT/COUNSELIN y.o. G1  P  at 33 6/7 weeks gestation (Estimated Date of Delivery: None noted.)    -Pregnancy  [ X ] stable  [   ] improving [  ] worsening    Diagnoses and all orders for this visit:    1. Type 1 diabetes mellitus affecting pregnancy  in second trimester, antepartum (Primary)        Factor V Leiden heterozygous  She is taking her Lovenox.      Pregestational DM, Type 1  NST reactive and reassuring    Summary of Plan  -Serial growth ultrasounds every 4 weeks with 2x per week  fetal surveillance after 32 weeks--she wants to do NST here on  and BPP on  or vice versa.   -Follow with endo per pt request for [id number]--ga are available at the [facility] office if she changes her mind  -Goal delivery 37-38 weeks.      Follow-up: Twice weekly for  testing given type 1 DM    Thank you for the consult and opportunity to care for this patient.  Please feel free to reach out with any questions or concerns.      I spent 10 minutes caring for this patient on this date of service. This time includes time spent by me in the following activities: preparing for the visit, reviewing tests, obtaining and/or reviewing a separately obtained history, performing a medically appropriate examination and/or evaluation, counseling and educating the patient/family/caregiver and independently interpreting results and communicating that information with the patient/family/caregiver with greater than 50% spent in counseling and coordination of care.     Lawanda Valente MD FACOG  Maternal Fetal Medicine-[facility]  Office: [phone]  [email or web address]

## 2022-12-09 NOTE — PROGRESS NOTES
Pt reports that she is doing well and denies vaginal bleeding, cramping, contractions or LOF at this time. Reports active fetal movement. Denies HA, visual changes or epigastric pain. Denies any additional complaints at time of appointment.   NST started at 0820    Vitals:    12/09/22 0816   BP: 110/77   Pulse: 92   Temp:

## 2022-12-13 ENCOUNTER — HOSPITAL ENCOUNTER (OUTPATIENT)
Dept: ULTRASOUND IMAGING | Facility: HOSPITAL | Age: 32
Discharge: HOME | End: 2022-12-13

## 2022-12-13 ENCOUNTER — OFFICE VISIT (OUTPATIENT)
Dept: OBSTETRICS AND GYNECOLOGY | Facility: CLINIC | Age: 32
End: 2022-12-13

## 2022-12-13 VITALS
HEART RATE: 91 BPM | BODY MASS INDEX: 33.27 KG/M2 | HEIGHT: 66 IN | TEMPERATURE: 98.2 F | WEIGHT: 207 LBS | DIASTOLIC BLOOD PRESSURE: 72 MMHG | SYSTOLIC BLOOD PRESSURE: 121 MMHG

## 2022-12-13 DIAGNOSIS — D68.51 FACTOR V LEIDEN MUTATION AFFECTING PREGNANCY: ICD-10-CM

## 2022-12-13 DIAGNOSIS — O99.119 FACTOR V LEIDEN MUTATION AFFECTING PREGNANCY: ICD-10-CM

## 2022-12-13 DIAGNOSIS — O24.012 TYPE 1 DIABETES MELLITUS AFFECTING PREGNANCY IN SECOND TRIMESTER, ANTEPARTUM: Primary | ICD-10-CM

## 2022-12-13 DIAGNOSIS — O24.012 TYPE 1 DIABETES MELLITUS AFFECTING PREGNANCY IN SECOND TRIMESTER, ANTEPARTUM: ICD-10-CM

## 2022-12-13 PROCEDURE — 99213 OFFICE O/P EST LOW 20 MIN: CPT | Performed by: OBSTETRICS & GYNECOLOGY

## 2022-12-13 NOTE — PROGRESS NOTES
MATERNAL FETAL MEDICINE Follow UP Note    Dear Dr Melissa Leonard MD:    Thank you for your kind referral of Pat Finley.  As you know, she is a 32 y.o. G1  P  at 34 3/7 weeks gestation (Estimated Date of Delivery: None noted.). This is a follow up.      Her antepartum course is complicated by:  Type 1 DM  Factor V Leiden Heterozygous with FH PE    Aneuploidy Screening: low risk panorama male      HPI: Today, she denies headache, blurry vision, RUQ pain. No vaginal bleeding, no contractions.     Review of History:  Past Medical History:   Diagnosis Date   • Diabetes type I (HCC)     [facility]- Dr. Win and Anita MA   • Heterozygous factor V Leiden mutation (HCC)    • [place] product of IVF pregnancy      Past Surgical History:   Procedure Laterality Date   • EYE SURGERY      Laser eye treatment-diabetic retinopathy       Social History     Socioeconomic History   • Marital status:    Tobacco Use   • Smoking status: Never   • Smokeless tobacco: Never   Vaping Use   • Vaping Use: Never used   Substance and Sexual Activity   • Alcohol use: Not Currently   • Drug use: Never   • Sexual activity: Yes     Partners: Male     Family History   Problem Relation Age of Onset   • Factor V Leiden deficiency Father    • Pulmonary embolism Father    • Diabetes Other         Maternal      Allergies   Allergen Reactions   • Adhesive Tape Rash     Very sensitive      Current Outpatient Medications on File Prior to Visit   Medication Sig Dispense Refill   • aspirin (aspirin) 81 MG EC tablet Take 1 tablet by mouth Daily. 90 tablet 4   • aspirin 81 MG EC tablet Take 81 mg by mouth Daily.     • calcium carbonate (TUMS) 500 MG chewable tablet Chew 2 tablets 2 (Two) Times a Day.     • Continuous Blood Gluc Sensor (Dexcom G6 Sensor) APPLY 1 SENSOR EVERY 10 DAYS     • Continuous Blood Gluc Transmit (Dexcom G6 Transmitter) misc See Admin Instructions.     • Enoxaparin Sodium (LOVENOX) 30  "MG/0.3ML solution prefilled syringe syringe ADMINISTER 1 SYRINGE UNDER THE SKIN EVERY DAY AS DIRECTED     • Enoxaparin Sodium (LOVENOX) 40 MG/0.4ML solution prefilled syringe syringe INJECT 0.4 MILLITER DAILY     • FIBER PO Take 2 capsules by mouth.     • Insulin Glargine (LANTUS SOLOSTAR) 100 UNIT/ML injection pen Inject 20 Units under the skin into the appropriate area as directed. Back up as needed for pump failure     • insulin lispro (humaLOG) 100 UNIT/ML injection Inject  under the skin 3 (Three) Times a Day Before Meals.     • Insulin Lispro (humaLOG) 100 UNIT/ML injection USE 50 UNITS IN PUMP AS DIRECTED     • Loratadine (CLARITIN PO) Take  by mouth.     • Prenatal Vit-Fe Fumarate-FA (PRENATAL VITAMIN PO) Take  by mouth.     • famotidine (Pepcid) 20 MG tablet Take 1 tablet by mouth Daily. 30 tablet 5     No current facility-administered medications on file prior to visit.        Past obstetric, gynecological, medical, surgical, family and social history reviewed.  Relevant lab work and imaging reviewed.    Review of systems  Constitutional:  denies fever, chills, malaise.   ENT/Mouth:  denies sore throat, tinnitis  Eyes: denies vision changes/pain  CV:  denies chest pain  Respiratory:  denies cough/SOB  GI:  denies N/V, diarrhea, abdominal pain.    :   denies dysuria  Skin:  denies lesions or pruritis   Neuro:  denies weakness, focal neurologic symptoms    Vitals:    12/13/22 1307   BP: 121/72   BP Location: Right arm   Patient Position: Sitting   Pulse: 91   Temp: 98.2 °F (36.8 °C)   TempSrc: Temporal   Weight: 93.9 kg (207 lb)   Height: 167.6 cm (66\")       PHYSICAL EXAM   GENERAL: Not in acute distress, AAOx3, pleasant  CARDIO: regular rate and rhythm  PULM: symmetric chest rise, speaking in complete sentences without difficulty  NEURO: awake, alert and oriented to person, place, and time  ABDOMINAL: No fundal tenderness, no rebound or guarding, gravid  EXTREMITIES: no bilateral lower extremity " edema/tenderness  SKIN: Warm, well-perfused      ULTRASOUND   Please view full ultrasound note on Imaging tab in ViewPoint.  Cephalic presentation  Posterior placenta.  MATTHEW 7.6 cm, which is normal.  BPP 8/8       ASSESSMENT/COUNSELIN y.o. G1  P  at 34 3 /7 weeks gestation (Estimated Date of Delivery: None noted.).    -Pregnancy  [ X ] stable  [   ] improving [  ] worsening    Diagnoses and all orders for this visit:    1. Type 1 diabetes mellitus affecting pregnancy in second trimester, antepartum (Primary)    2. Factor V Leiden mutation affecting pregnancy (HCC)        -Factor V Leiden heterozygous  She is taking her Lovenox.      Pregestational DM, Type 1  She is following with her endocrinologist and doing well. Pt reports her latest A1C is up to 5.0 from 4.7.  Encouraged the pt to continue the hard work.  Fetal testing is reassuring and growth/BPP/fluid is normal.  She is doing well.  She has a profile set on her pump for after delivery.      We also discussed preeclampsia risk reduction with baby aspirin, which she has been taking daily.      She has had a normal ECHO.    All questions and concerns answered.    Factor V Leiden: on lovenox.      Summary of Plan  -Serial growth ultrasounds every 4 weeks with 2x per week  fetal surveillance after 32 weeks--She wants Tuesday BPP/Thursday NST  -Follow with endo per pt request for [id number]--yv are available at the [facility] office if she changes her mind  -Goal delivery 37-38 weeks.        Follow-up: Twice weekly for  testing given type 1 DM    Thank you for the consult and opportunity to care for this patient.  Please feel free to reach out with any questions or concerns.      I spent 15 minutes caring for this patient on this date of service. This time includes time spent by me in the following activities: preparing for the visit, reviewing tests, obtaining and/or reviewing a separately obtained history, performing a medically appropriate examination  and/or evaluation, counseling and educating the patient/family/caregiver and independently interpreting results and communicating that information with the patient/family/caregiver with greater than 50% spent in counseling and coordination of care.     Lwaanda Valente MD [facility] [place]  Maternal Fetal Medicine-[facility]  Office: [phone]  [email or web address]

## 2022-12-13 NOTE — PROGRESS NOTES
Pt. Reports that she is doing well and denies vaginal bleeding, cramping, contractions or leaking of fluid at this time. Reports active fetal movement.  Denies headache, visual changes or epigastric pain.  Denies any additional complaints at time of appointment.  Next OB appointment scheduled for 12/27/22.    Vitals:    12/13/22 1307   BP: 121/72   Pulse: 91   Temp: 98.2 °F (36.8 °C)

## 2022-12-15 NOTE — PROGRESS NOTES
MATERNAL FETAL MEDICINE NST F/U Note    Dear Dr Leonard:    Thank you for your kind referral of Pat Finley.  As you know, she is a 32 y.o.   at 34+ 6/7 weeks gestation (Estimated Date of Delivery: 23). This is a follow up.      Her antepartum course is complicated by:  Type 1 DM  Factor V Leiden Heterozygous with FH PE    Aneuploidy Screening: low risk panorama male      HPI: Today, she denies headache, blurry vision, RUQ pain. No vaginal bleeding, no contractions.     Review of History:  Past Medical History:   Diagnosis Date   • Diabetes type I (HCC)     [facility]- Dr. Win and Anita MA   • Heterozygous factor V Leiden mutation (HCC)    •  product of IVF pregnancy      Past Surgical History:   Procedure Laterality Date   • EYE SURGERY      Laser eye treatment-diabetic retinopathy       Social History     Socioeconomic History   • Marital status:    Tobacco Use   • Smoking status: Never   • Smokeless tobacco: Never   Vaping Use   • Vaping Use: Never used   Substance and Sexual Activity   • Alcohol use: Not Currently   • Drug use: Never   • Sexual activity: Yes     Partners: Male     Family History   Problem Relation Age of Onset   • Factor V Leiden deficiency Father    • Pulmonary embolism Father    • Diabetes Other         Maternal      Allergies   Allergen Reactions   • Adhesive Tape Rash     Very sensitive      Current Outpatient Medications on File Prior to Visit   Medication Sig Dispense Refill   • aspirin (aspirin) 81 MG EC tablet Take 1 tablet by mouth Daily. 90 tablet 4   • aspirin 81 MG EC tablet Take 81 mg by mouth Daily.     • calcium carbonate (TUMS) 500 MG chewable tablet Chew 2 tablets 2 (Two) Times a Day.     • Continuous Blood Gluc Sensor (Dexcom G6 Sensor) APPLY 1 SENSOR EVERY 10 DAYS     • Continuous Blood Gluc Transmit (Dexcom G6 Transmitter) misc See Admin Instructions.     • Enoxaparin Sodium (LOVENOX) 30 MG/0.3ML solution  prefilled syringe syringe ADMINISTER 1 SYRINGE UNDER THE SKIN EVERY DAY AS DIRECTED     • Enoxaparin Sodium (LOVENOX) 40 MG/0.4ML solution prefilled syringe syringe INJECT 0.4 MILLITER DAILY     • famotidine (Pepcid) 20 MG tablet Take 1 tablet by mouth Daily. 30 tablet 5   • FIBER PO Take 2 capsules by mouth.     • Insulin Glargine (LANTUS SOLOSTAR) 100 UNIT/ML injection pen Inject 20 Units under the skin into the appropriate area as directed. Back up as needed for pump failure     • insulin lispro (humaLOG) 100 UNIT/ML injection Inject  under the skin 3 (Three) Times a Day Before Meals.     • Insulin Lispro (humaLOG) 100 UNIT/ML injection USE 50 UNITS IN PUMP AS DIRECTED     • Loratadine (CLARITIN PO) Take  by mouth.     • Prenatal Vit-Fe Fumarate-FA (PRENATAL VITAMIN PO) Take  by mouth.       No current facility-administered medications on file prior to visit.        Past obstetric, gynecological, medical, surgical, family and social history reviewed.  Relevant lab work and imaging reviewed.    Review of systems  Constitutional:  denies fever, chills, malaise.   ENT/Mouth:  denies sore throat, tinnitis  Eyes: denies vision changes/pain  CV:  denies chest pain  Respiratory:  denies cough/SOB  GI:  denies N/V, diarrhea, abdominal pain.    :   denies dysuria  Skin:  denies lesions or pruritis   Neuro:  denies weakness, focal neurologic symptoms    Vitals:    22 0906   BP: 120/85   BP Location: Right arm   Patient Position: Sitting   Pulse: 85   Temp: 97.8 °F (36.6 °C)   TempSrc: Temporal   Weight: 93.9 kg (207 lb)      NST:  Type 1 DM- 22 minutes  150/mod/+acc/no decels.    1 contractions, rare irritability  Cat 1    ASSESSMENT/COUNSELIN y.o.   at 34 6/7 weeks gestation (23)    -Pregnancy  [ X ] stable  [   ] improving [  ] worsening    Diagnoses and all orders for this visit:    1. Type 1 diabetes mellitus affecting pregnancy in second trimester, antepartum        Factor V Leiden  heterozygous  She is taking her Lovenox.      Pregestational DM, Type 1  NST reactive and reassuring    Type 1 Diabetes   [  ]  well-controlled  [  ] good-control  [  ] fair-control [  ] poorly-controlled  [  ] non-compliant  [   ] stable  [   ] improving [X ] worsening    Endocrinology is managing her diabetes, but pt had questions in the office today.      Pt states she is having highs which are riding in the low 200's.  She had increased her basal insulin and it took care of a lot of her issues before, but now she is treating it with sensitivity factor and having to add more approximate x3s before it finally falls.  She states she is finding herself not being able to eat.   She is curious if this is what she needs to be doing or if she needs to do something different.  After reviewing her values on her pump:  We added a  12:00 noon basal rate of 1.55 (from 1.45) and increased her 2PM basal rate to 1:55, as well.  Further we encouraged the pt to be urine dipping her ketones.  She has been doing well since that time.      We discussed the next step would be to adjust her sensitivity factor.  I discussed with patient going from 1:25 to 1:20 previously.  She is comfortable with these changes.       Diabetes education:   Disc importance of good glycemic control in pregnancy. Disc the risk associated with poorly or uncontrolled GDM including but not limited to excessive birth weight,  birth, lung immaturity, polyhydramnios,  hypoglycemia, stillbirth, etc with blood glucose readings at 200 consecutively.  Pt verbalized understanding. She has done well and been very controlled this pregnancy.      Summary of Plan  -Serial growth ultrasounds every 4 weeks with 2x per week  fetal surveillance after 32 weeks--she wants to do NST here on  and BPP on  or vice versa.   -Follow with endo per pt request for [id number] will be following up with her to notify her of the changes and to review.   We continue to be available at the [facility] office if she has further questions.    -Goal delivery 37-38 weeks.      Follow-up: Twice weekly for  testing given type 1 DM    Thank you for the consult and opportunity to care for this patient.  Please feel free to reach out with any questions or concerns.      I saw this pt and scribed portions of the note for Dr. Lawanda Valente.    ABIOLA Montes    I spent 25 minutes caring for this patient on this date of service. This time includes time spent by me in the following activities: preparing for the visit, reviewing tests, obtaining and/or reviewing a separately obtained history, performing a medically appropriate examination and/or evaluation, counseling and educating the patient/family/caregiver and independently interpreting results and communicating that information with the patient/family/caregiver with greater than 50% spent in counseling and coordination of care.     ABIOLA Montes, [facility]  Maternal Fetal Medicine-[address], [address]  [place] [address]  Office: [phone]    Attestation:  I agree with the scribed portions of this note.    Lawanda Valente MD [facility]  Maternal Fetal Medicine-[facility]  Office: [phone]  [email or web address]

## 2022-12-16 ENCOUNTER — APPOINTMENT (OUTPATIENT)
Dept: ULTRASOUND IMAGING | Facility: HOSPITAL | Age: 32
End: 2022-12-16

## 2022-12-16 ENCOUNTER — OFFICE VISIT (OUTPATIENT)
Dept: OBSTETRICS AND GYNECOLOGY | Facility: CLINIC | Age: 32
End: 2022-12-16

## 2022-12-16 VITALS
DIASTOLIC BLOOD PRESSURE: 85 MMHG | WEIGHT: 207 LBS | TEMPERATURE: 97.8 F | BODY MASS INDEX: 33.41 KG/M2 | SYSTOLIC BLOOD PRESSURE: 120 MMHG | HEART RATE: 85 BPM

## 2022-12-16 DIAGNOSIS — O24.012 TYPE 1 DIABETES MELLITUS AFFECTING PREGNANCY IN SECOND TRIMESTER, ANTEPARTUM: ICD-10-CM

## 2022-12-16 PROCEDURE — 99213 OFFICE O/P EST LOW 20 MIN: CPT | Performed by: OBSTETRICS & GYNECOLOGY

## 2022-12-16 PROCEDURE — 59025 FETAL NON-STRESS TEST: CPT | Performed by: OBSTETRICS & GYNECOLOGY

## 2022-12-16 NOTE — PROGRESS NOTES
Here for scheduled NST. Pt reports that she is doing well and denies vaginal bleeding, cramping, contractions or LOF at this time. Reports active fetal movement. Denies HA, visual changes or epigastric pain. Denies any additional complaints at time of appointment. Next OB appointment scheduled for 12/27.    Vitals:    12/16/22 0906   BP: 120/85   Pulse: 85   Temp: 97.8 °F (36.6 °C)

## 2022-12-20 ENCOUNTER — HOSPITAL ENCOUNTER (OUTPATIENT)
Dept: ULTRASOUND IMAGING | Facility: HOSPITAL | Age: 32
Discharge: HOME | End: 2022-12-20
Admitting: OBSTETRICS & GYNECOLOGY

## 2022-12-20 ENCOUNTER — OFFICE VISIT (OUTPATIENT)
Dept: OBSTETRICS AND GYNECOLOGY | Facility: CLINIC | Age: 32
End: 2022-12-20

## 2022-12-20 VITALS
TEMPERATURE: 98.2 F | SYSTOLIC BLOOD PRESSURE: 132 MMHG | WEIGHT: 207.2 LBS | DIASTOLIC BLOOD PRESSURE: 72 MMHG | BODY MASS INDEX: 33.44 KG/M2 | HEART RATE: 111 BPM

## 2022-12-20 DIAGNOSIS — O99.119 FACTOR V LEIDEN MUTATION AFFECTING PREGNANCY: Primary | ICD-10-CM

## 2022-12-20 DIAGNOSIS — O24.012 TYPE 1 DIABETES MELLITUS AFFECTING PREGNANCY IN SECOND TRIMESTER, ANTEPARTUM: ICD-10-CM

## 2022-12-20 DIAGNOSIS — D68.51 FACTOR V LEIDEN MUTATION AFFECTING PREGNANCY: Primary | ICD-10-CM

## 2022-12-20 DIAGNOSIS — O99.119 FACTOR V LEIDEN MUTATION AFFECTING PREGNANCY: ICD-10-CM

## 2022-12-20 DIAGNOSIS — D68.51 FACTOR V LEIDEN MUTATION AFFECTING PREGNANCY: ICD-10-CM

## 2022-12-20 PROCEDURE — 99214 OFFICE O/P EST MOD 30 MIN: CPT | Performed by: OBSTETRICS & GYNECOLOGY

## 2022-12-20 PROCEDURE — 76819 FETAL BIOPHYS PROFIL W/O NST: CPT | Performed by: OBSTETRICS & GYNECOLOGY

## 2022-12-20 PROCEDURE — 76819 FETAL BIOPHYS PROFIL W/O NST: CPT

## 2022-12-20 RX ORDER — HEPARIN SODIUM 5000 [USP'U]/ML
10000 INJECTION, SOLUTION INTRAVENOUS; SUBCUTANEOUS EVERY 12 HOURS SCHEDULED
Qty: 120 ML | Refills: 3 | Status: SHIPPED | OUTPATIENT
Start: 2022-12-20 | End: 2023-01-08 | Stop reason: HOSPADM

## 2022-12-20 NOTE — PROGRESS NOTES
Pt reports that she is doing well and denies vaginal bleeding, cramping, contractions or LOF at this time. Reports active fetal movement. Denies HA, visual changes or epigastric pain. Denies any additional complaints at time of appointment. Next OB appointment scheduled for 12/27.  Reports blood sugar has been better controlled since last adjustments made by Dr. Valente on 12/16. Pt awaiting to hear back from her Endocrinologist.     Vitals:    12/20/22 1005   BP: 132/72   Pulse: 111   Temp: 98.2 °F (36.8 °C)

## 2022-12-20 NOTE — PROGRESS NOTES
MATERNAL FETAL MEDICINE-Follow up note       The patient presents for a a follow-up visit today at 35.3 week for type 1 diabetes and factor V Leiden.    Patient is currently on insulin and is managed by her endocrinologist.  Overall her blood sugars she states are in stable condition with her fastings anywhere from  and her 2-hour postprandials approximately less than 120.    She is on a prophylactic dose of Lovenox 40 mg a day.    She denies any obstetrical issues or problems and reports good fetal movement today.    ROS:   Please see pertinent ROS in the HPI.  All other systems are negative.      Vitals:    12/20/22 1005   BP: 132/72   BP Location: Right arm   Patient Position: Sitting   Pulse: 111   Temp: 98.2 °F (36.8 °C)   TempSrc: Temporal   Weight: 94 kg (207 lb 3.2 oz)       PHYSICAL EXAM   GENERAL: Not in acute distress, gravid   NEURO: awake, alert and oriented to person, place, and time  ABDOMINAL: No fundal tenderness, no rebound or guarding     ULTRASOUND   Evaluation reveals a BPP of 8 out of 8.  MATTHEW is 8.6 cm.  Cephalic presentation.    IMPRESSIONS:  Diagnoses and all orders for this visit:    1. Factor V Leiden mutation affecting pregnancy (HCC) (Primary)    2. Type 1 diabetes mellitus affecting pregnancy in second trimester, antepartum       RECOMMENDATIONS:  1.  Factor V Leiden mutation.  She is currently on a prophylactic dose of Lovenox.  I am go ahead and transition her to heparin 10,000 units subcu twice a day.  I have sent a prescription to her pharmacy.    2.  Type 1 diabetes.  Her blood sugars are currently managed by her endocrinologist.  She is to continue twice-weekly testing alternating between our offices.  Reviewed with her fetal movement and fetal kick counts.    Follow-up visit in 1 week.    Thank you for allowing me to participate in the care of your patient. Please do not hesitate to contact me if you have any questions or concerns.       Marizol Angel DO,  FACOG  MATERNAL FETAL MEDICINE     This was a 30 minute outpatient consult in which over 50% of my time was spent in reviewing the patient's records, counseling, charting, and/or  coordinating care.

## 2022-12-22 ENCOUNTER — OFFICE VISIT (OUTPATIENT)
Dept: OBSTETRICS AND GYNECOLOGY | Facility: CLINIC | Age: 32
End: 2022-12-22

## 2022-12-22 VITALS
TEMPERATURE: 97.8 F | DIASTOLIC BLOOD PRESSURE: 77 MMHG | HEART RATE: 107 BPM | BODY MASS INDEX: 33.41 KG/M2 | SYSTOLIC BLOOD PRESSURE: 136 MMHG | WEIGHT: 207 LBS

## 2022-12-22 DIAGNOSIS — O24.012 TYPE 1 DIABETES MELLITUS AFFECTING PREGNANCY IN SECOND TRIMESTER, ANTEPARTUM: Primary | ICD-10-CM

## 2022-12-22 PROCEDURE — 59025 FETAL NON-STRESS TEST: CPT | Performed by: OBSTETRICS & GYNECOLOGY

## 2022-12-22 PROCEDURE — 99213 OFFICE O/P EST LOW 20 MIN: CPT | Performed by: OBSTETRICS & GYNECOLOGY

## 2022-12-22 NOTE — PROGRESS NOTES
Pt. Reports that she is doing well and denies vaginal bleeding, cramping, contractions or leaking of fluid at this time. Reports active fetal movement.  Denies headache, visual changes or epigastric pain.  Denies any additional complaints at time of appointment.  Next OB appointment scheduled for 12/27/22.    There were no vitals filed for this visit.

## 2022-12-22 NOTE — PROGRESS NOTES
MATERNAL FETAL MEDICINE F/U Note    Dear Dr Melissa Leonard MD:    Thank you for your kind referral of Pat Finley.  As you know, she is a 32 y.o.   At 35+ 5/7 weeks gestation (Estimated Date of Delivery: 23). This is a follow-up for an NST.     Her antepartum course is complicated by:  Type 1 DM  Factor V Leiden Heterozygous with FH PE    Aneuploidy Screening:  low risk panorama male      HPI: Today, she denies headache, blurry vision, RUQ pain. No vaginal bleeding, vaginal leakage, no contractions.     Review of History:  Past Medical History:   Diagnosis Date   • Diabetes type I (HCC)     [facility]- Dr. Win and Anita MA   • Heterozygous factor V Leiden mutation (HCC)    •  product of IVF pregnancy      Past Surgical History:   Procedure Laterality Date   • EYE SURGERY      Laser eye treatment-diabetic retinopathy       OB Hx:    Social History     Socioeconomic History   • Marital status:    Tobacco Use   • Smoking status: Never   • Smokeless tobacco: Never   Vaping Use   • Vaping Use: Never used   Substance and Sexual Activity   • Alcohol use: Not Currently   • Drug use: Never   • Sexual activity: Yes     Partners: Male     Family History   Problem Relation Age of Onset   • Factor V Leiden deficiency Father    • Pulmonary embolism Father    • Diabetes Other         Maternal      Allergies   Allergen Reactions   • Adhesive Tape Rash     Very sensitive      Current Outpatient Medications on File Prior to Visit   Medication Sig Dispense Refill   • aspirin 81 MG EC tablet Take 81 mg by mouth Daily.     • calcium carbonate (TUMS) 500 MG chewable tablet Chew 2 tablets 2 (Two) Times a Day.     • Continuous Blood Gluc Sensor (Dexcom G6 Sensor) APPLY 1 SENSOR EVERY 10 DAYS     • Continuous Blood Gluc Transmit (Dexcom G6 Transmitter) misc See Admin Instructions.     • Enoxaparin Sodium (LOVENOX) 30 MG/0.3ML solution prefilled syringe syringe  ADMINISTER 1 SYRINGE UNDER THE SKIN EVERY DAY AS DIRECTED     • famotidine (Pepcid) 20 MG tablet Take 1 tablet by mouth Daily. 30 tablet 5   • FIBER PO Take 2 capsules by mouth.     • Insulin Glargine (LANTUS SOLOSTAR) 100 UNIT/ML injection pen Inject 20 Units under the skin into the appropriate area as directed. Back up as needed for pump failure     • insulin lispro (humaLOG) 100 UNIT/ML injection Inject  under the skin 3 (Three) Times a Day Before Meals.     • Insulin Lispro (humaLOG) 100 UNIT/ML injection USE 50 UNITS IN PUMP AS DIRECTED     • Prenatal Vit-Fe Fumarate-FA (PRENATAL VITAMIN PO) Take  by mouth.     • aspirin (aspirin) 81 MG EC tablet Take 1 tablet by mouth Daily. 90 tablet 4   • Heparin Sodium, Porcine, (heparin, porcine,) 5000 UNIT/ML injection Inject 2 mL under the skin into the appropriate area as directed Every 12 (Twelve) Hours for 120 days. 120 mL 3   • Loratadine (CLARITIN PO) Take  by mouth.       No current facility-administered medications on file prior to visit.        Past obstetric, gynecological, medical, surgical, family and social history reviewed.  Relevant lab work and imaging reviewed.    Review of systems  Constitutional:  denies fever, chills, malaise.   ENT/Mouth:  denies sore throat, tinnitis  Eyes: denies vision changes/pain  CV:  denies chest pain  Respiratory:  denies cough/SOB  GI:  denies N/V, diarrhea, abdominal pain.    :   denies dysuria  Skin:  denies lesions or pruritis   Neuro:  denies weakness, focal neurologic symptoms    Vitals:    12/22/22 0808   BP: 136/77   BP Location: Right arm   Patient Position: Sitting   Pulse: 107   Temp: 97.8 °F (36.6 °C)   TempSrc: Temporal   Weight: 93.9 kg (207 lb)       PHYSICAL EXAM   GENERAL: Not in acute distress, AAOx3, pleasant  CARDIO: regular rate and rhythm  PULM: symmetric chest rise, speaking in complete sentences without difficulty  NEURO: awake, alert and oriented to person, place, and time  ABDOMINAL: No fundal  Event Note/OB tenderness, no rebound or guarding, gravid  EXTREMITIES: no bilateral lower extremity edema/tenderness  SKIN: Warm, well-perfused    NST:    Type 1 DM- 22 Minutes  155/mod/+acc/no decels  1 contraction with rare irritability  Cat 1      ASSESSMENT/COUNSELIN y.o. G  P  at 35+ 5/7 weeks gestation (Estimated Date of Delivery: 23)    -Pregnancy  [ X ] stable  [   ] improving [  ] worsening    There are no diagnoses linked to this encounter.     1. Type 1 diabetes mellitus affecting pregnancy in second trimester, antepartum      Factor V Leiden heterozygous  She is taking her Lovenox.       Pregestational DM, Type 1  NST reactive and reassuring     Type 1 Diabetes   [  ]  well-controlled  [  ] good-control  [  ] fair-control [  ] poorly-controlled  [  ] non-compliant  [ X] stable  [   ] improving [ ] worsening    Endocrinology is managing her diabetes, but pt had questions in the office last Friday.  Some changes were made in her regime (see note) and pt reports things have been better since and has only had one time when she had to treat with Sensitivity Fx which was last night.  Issue seems to be resolving.      Summary of Plan  -Serial growth ultrasounds every 4 weeks with 2x per week  fetal surveillance after 32 weeks--she wants to do NST here on  and BPP on  or vice versa.   -Follow with endo per pt request for [id number] will be following up with her to notify her of the changes and to review.  We continue to be available at the [facility] office if she has further questions.    -Pt to monitor for fetal movement and contractions as discussed in office today.  Pt to report to L&D with either decreased fetal movement or contractions 5   -Goal delivery 37-38 weeks (Pt has appt with Horacio next week and will be receiving induction date at that time).       Follow-up: Twice weekly for  testing given type 1 DM:  Pt scheduled out in the office today    Thank you for the consult and  opportunity to care for this patient.  Please feel free to reach out with any questions or concerns.      I spent 15 minutes caring for this patient on this date of service. This time includes time spent by me in the following activities: preparing for the visit, reviewing tests, obtaining and/or reviewing a separately obtained history, performing a medically appropriate examination and/or evaluation, counseling and educating the patient/family/caregiver and independently interpreting results and communicating that information with the patient/family/caregiver with greater than 50% spent in counseling and coordination of care.     ABIOLA Montes, JOSE ENRIQUE-BC  Maternal Fetal Medicine-[facility]  Office: [phone]  [email or web address]

## 2022-12-27 ENCOUNTER — OFFICE VISIT (OUTPATIENT)
Dept: OBSTETRICS AND GYNECOLOGY | Facility: CLINIC | Age: 32
End: 2022-12-27

## 2022-12-27 ENCOUNTER — HOSPITAL ENCOUNTER (OUTPATIENT)
Dept: ULTRASOUND IMAGING | Facility: HOSPITAL | Age: 32
Discharge: HOME | End: 2022-12-27
Admitting: OBSTETRICS & GYNECOLOGY

## 2022-12-27 VITALS
WEIGHT: 209.4 LBS | SYSTOLIC BLOOD PRESSURE: 126 MMHG | HEART RATE: 87 BPM | TEMPERATURE: 97.7 F | BODY MASS INDEX: 33.8 KG/M2 | DIASTOLIC BLOOD PRESSURE: 78 MMHG

## 2022-12-27 DIAGNOSIS — O99.119 FACTOR V LEIDEN MUTATION AFFECTING PREGNANCY: ICD-10-CM

## 2022-12-27 DIAGNOSIS — D68.51 FACTOR V LEIDEN MUTATION AFFECTING PREGNANCY: ICD-10-CM

## 2022-12-27 DIAGNOSIS — O24.012 TYPE 1 DIABETES MELLITUS AFFECTING PREGNANCY IN SECOND TRIMESTER, ANTEPARTUM: Primary | ICD-10-CM

## 2022-12-27 DIAGNOSIS — O24.012 TYPE 1 DIABETES MELLITUS AFFECTING PREGNANCY IN SECOND TRIMESTER, ANTEPARTUM: ICD-10-CM

## 2022-12-27 LAB — EXTERNAL GROUP B STREP ANTIGEN: NEGATIVE

## 2022-12-27 PROCEDURE — 76819 FETAL BIOPHYS PROFIL W/O NST: CPT

## 2022-12-27 PROCEDURE — 76819 FETAL BIOPHYS PROFIL W/O NST: CPT | Performed by: OBSTETRICS & GYNECOLOGY

## 2022-12-27 PROCEDURE — 99213 OFFICE O/P EST LOW 20 MIN: CPT | Performed by: NURSE PRACTITIONER

## 2022-12-27 PROCEDURE — 76816 OB US FOLLOW-UP PER FETUS: CPT | Performed by: OBSTETRICS & GYNECOLOGY

## 2022-12-27 PROCEDURE — 76816 OB US FOLLOW-UP PER FETUS: CPT

## 2022-12-27 NOTE — PROGRESS NOTES
MATERNAL FETAL MEDICINE  Note    Dear Dr Melissa Leonard MD:    Thank you for your kind referral of Pat Finley.  As you know, she is a 32 y.o.   at 36+ 3/7 weeks gestation (Estimated Date of Delivery: 23). This is a follow-up.    Pt is attended with her , Michael.      Her antepartum course is complicated by:  Type 1 DM  Factor V Leiden Heterozygous with FH PE    Aneuploidy Screening:   low risk panorama male      HPI: Today, she denies headache, blurry vision, RUQ pain. No vaginal bleeding, vaginal leakage, no contractions and reports positive fetal movement.      Review of History:  Past Medical History:   Diagnosis Date   • Diabetes type I (HCC)     [facility]- Dr. Win and Anita MA   • Heterozygous factor V Leiden mutation (HCC)    •  product of IVF pregnancy      Past Surgical History:   Procedure Laterality Date   • EYE SURGERY      Laser eye treatment-diabetic retinopathy       OB Hx:    Social History     Socioeconomic History   • Marital status:    Tobacco Use   • Smoking status: Never   • Smokeless tobacco: Never   Vaping Use   • Vaping Use: Never used   Substance and Sexual Activity   • Alcohol use: Not Currently   • Drug use: Never   • Sexual activity: Yes     Partners: Male     Family History   Problem Relation Age of Onset   • Factor V Leiden deficiency Father    • Pulmonary embolism Father    • Diabetes Other         Maternal      Allergies   Allergen Reactions   • Adhesive Tape Rash     Very sensitive      Current Outpatient Medications on File Prior to Visit   Medication Sig Dispense Refill   • aspirin (aspirin) 81 MG EC tablet Take 1 tablet by mouth Daily. 90 tablet 4   • aspirin 81 MG EC tablet Take 81 mg by mouth Daily.     • calcium carbonate (TUMS) 500 MG chewable tablet Chew 2 tablets 2 (Two) Times a Day.     • Continuous Blood Gluc Sensor (Dexcom G6 Sensor) APPLY 1 SENSOR EVERY 10 DAYS     • Continuous Blood Gluc  Transmit (Dexcom G6 Transmitter) misc See Admin Instructions.     • Enoxaparin Sodium (LOVENOX) 30 MG/0.3ML solution prefilled syringe syringe ADMINISTER 1 SYRINGE UNDER THE SKIN EVERY DAY AS DIRECTED     • famotidine (Pepcid) 20 MG tablet Take 1 tablet by mouth Daily. 30 tablet 5   • FIBER PO Take 2 capsules by mouth.     • Heparin Sodium, Porcine, (heparin, porcine,) 5000 UNIT/ML injection Inject 2 mL under the skin into the appropriate area as directed Every 12 (Twelve) Hours for 120 days. 120 mL 3   • Insulin Glargine (LANTUS SOLOSTAR) 100 UNIT/ML injection pen Inject 20 Units under the skin into the appropriate area as directed. Back up as needed for pump failure     • insulin lispro (humaLOG) 100 UNIT/ML injection Inject  under the skin 3 (Three) Times a Day Before Meals.     • Insulin Lispro (humaLOG) 100 UNIT/ML injection USE 50 UNITS IN PUMP AS DIRECTED     • Loratadine (CLARITIN PO) Take  by mouth.     • Prenatal Vit-Fe Fumarate-FA (PRENATAL VITAMIN PO) Take  by mouth.       No current facility-administered medications on file prior to visit.        Past obstetric, gynecological, medical, surgical, family and social history reviewed.  Relevant lab work and imaging reviewed.    Review of systems  Constitutional:  denies fever, chills, malaise.   ENT/Mouth:  denies sore throat, tinnitis  Eyes: denies vision changes/pain  CV:  denies chest pain  Respiratory:  denies cough/SOB  GI:  denies N/V, diarrhea, abdominal pain.    :   denies dysuria  Skin:  denies lesions or pruritis   Neuro:  denies weakness, focal neurologic symptoms    There were no vitals filed for this visit.    PHYSICAL EXAM   GENERAL: Not in acute distress, AAOx3, pleasant  CARDIO: regular rate and rhythm  PULM: symmetric chest rise, speaking in complete sentences without difficulty  NEURO: awake, alert and oriented to person, place, and time  ABDOMINAL: No fundal tenderness, no rebound or guarding, gravid  EXTREMITIES: no bilateral lower  extremity edema/tenderness  SKIN: Warm, well-perfused      ULTRASOUND   Please view full ultrasound note on Imaging tab in ViewPoint.    Biophysical Profile   ===============     2: Fetal breathing movements   2: Gross body movements   2: Fetal tone   2: Amniotic fluid volume    Biophysical profile score       Impression   =========     Cephalic presentation.   Posterior placenta   MATTHEW 6.7 cm with 2x2 pocket which is normal.   EFW 2776 g (37%, AC 29%)       Recommendation   ===============     MATTHEW normal with 2x2 pocket, but low normal. Appears to be positional (subjectively normal fluid, but all on one side when the baby moved) on US. Will change her NST   later this week to a BPP.       ASSESSMENT/COUNSELIN y.o.   at 36+ 3/7 weeks gestation (Estimated Date of Delivery: 23)    -Pregnancy  [ X ] stable  [   ] improving [  ] worsening    There are no diagnoses linked to this encounter.     Diagnoses and all orders for this visit:     1. Type 1 diabetes mellitus affecting pregnancy in second trimester, antepartum (Primary)     2. Factor V Leiden mutation affecting pregnancy (HCC)      -Factor V Leiden heterozygous  She has stopped taking Lovenox and has transitioned to heparin 10,000 units subcu twice daily.      Pregestational DM, Type 1  She is following with her endocrinologist and doing well.  Pt states she had some issues throughout the holidays, but that was due to dietary intake. Further, she changed her Sensitivity Factor from 20 to 25.  She states when she checks her sugars, she is usually going up 4 hours after eating and therefore she made the adjustment.  Let her know that this was fine for now, but that if Dr. Valente wanted to make some changes, she could reassess at her Friday appointment.  Encouraged the pt to continue the hard work.      Fetal testing is reassuring and growth/BPP/fluid is normal though MATTHEW was decreased, but it had been a little low already.  With positive fetal  movement and a 2x2 pocket, this is reassuring fetal status and Dr. Valente said that this could really just be the way the baby was positioned, so we will reevaluate on Friday.  She further asked questions about fetal breathing which looked reassuring in the office today.   Pt states she is seeing her OB later today.   All questions and concerns were addressed, but encouraged pt to call with questions later if they had any.       She is still taking her baby aspirin daily for risk of pre eclampsia.       She has had a normal ECHO previously.       Factor V Leiden: Switched to Heparin.       Summary of Plan  -Serial growth ultrasounds every 4 weeks with 2x per week  fetal surveillance after 32 weeks--She wants Tuesday BPP/Friday NSTs   -This Friday, pt will have a BPP and/or NST to monitor MATTHEW.    -Follow with Endo per pt request for J2DP--ls are available at the [facility] office if she changes her mind (we have consulted her with her blood sugars but Endo does the management).  She reports she is not scheduled with them until after her delivery, but can message them for questions.   -Fetal movement instructions given continue daily until delivery; instructed to report to labor and delivery if cannot achieve more than 10 kicks in one hour or if she perceives a decrease in fetal movement.  -Goal delivery 37-38 weeks.        Follow-up: Twice weekly for  testing given type 1 DM    Thank you for the consult and opportunity to care for this patient.  Please feel free to reach out with any questions or concerns.      I spent 20 minutes caring for this patient on this date of service. This time includes time spent by me in the following activities: preparing for the visit, reviewing tests, obtaining and/or reviewing a separately obtained history, performing a medically appropriate examination and/or evaluation, counseling and educating the patient/family/caregiver and independently interpreting results and  communicating that information with the patient/family/caregiver with greater than 50% spent in counseling and coordination of care.     ABIOLA Montes, [facility]-BC  Maternal Fetal Medicine-[facility]  Office: [phone]  [email or web address]

## 2022-12-27 NOTE — PROGRESS NOTES
Pt reports that she is doing well and denies vaginal bleeding, cramping, contractions or LOF at this time. Reports active fetal movement. Denies HA, visual changes or epigastric pain. Denies any additional complaints at time of appointment. Next OB appointment scheduled for today, 12/27, at 1pm.    Vitals:    12/27/22 1102   BP: 126/78   Pulse: 87   Temp: 97.7 °F (36.5 °C)

## 2022-12-28 NOTE — PROGRESS NOTES
MATERNAL FETAL MEDICINE Follow UP Note    Dear Dr Melissa Leonard MD:    Thank you for your kind referral of Pat Finley.  As you know, she is a 32 y.o. G1  P  at 36 6/7 weeks gestation (Estimated Date of Delivery: None noted.). This is a follow up.      Her antepartum course is complicated by:  Type 1 DM  Factor V Leiden Heterozygous with FH PE    Aneuploidy Screening: low risk panorama male      HPI: Today, she denies headache, blurry vision, RUQ pain. No vaginal bleeding, no contractions.     Review of History:  Past Medical History:   Diagnosis Date   • Diabetes type I (HCC)     [facility]- Dr. Win and Anita MA   • Heterozygous factor V Leiden mutation (HCC)    • [place] product of IVF pregnancy      Past Surgical History:   Procedure Laterality Date   • EYE SURGERY      Laser eye treatment-diabetic retinopathy       Social History     Socioeconomic History   • Marital status:    Tobacco Use   • Smoking status: Never   • Smokeless tobacco: Never   Vaping Use   • Vaping Use: Never used   Substance and Sexual Activity   • Alcohol use: Not Currently   • Drug use: Never   • Sexual activity: Yes     Partners: Male     Family History   Problem Relation Age of Onset   • Factor V Leiden deficiency Father    • Pulmonary embolism Father    • Diabetes Other         Maternal      Allergies   Allergen Reactions   • Adhesive Tape Rash     Very sensitive      Current Outpatient Medications on File Prior to Visit   Medication Sig Dispense Refill   • aspirin (aspirin) 81 MG EC tablet Take 1 tablet by mouth Daily. 90 tablet 4   • calcium carbonate (TUMS) 500 MG chewable tablet Chew 2 tablets 2 (Two) Times a Day.     • Continuous Blood Gluc Sensor (Dexcom G6 Sensor) APPLY 1 SENSOR EVERY 10 DAYS     • Continuous Blood Gluc Transmit (Dexcom G6 Transmitter) misc See Admin Instructions.     • famotidine (Pepcid) 20 MG tablet Take 1 tablet by mouth Daily. 30 tablet 5   • FIBER PO  Take 2 capsules by mouth.     • Heparin Sodium, Porcine, (heparin, porcine,) 5000 UNIT/ML injection Inject 2 mL under the skin into the appropriate area as directed Every 12 (Twelve) Hours for 120 days. 120 mL 3   • Insulin Glargine (LANTUS SOLOSTAR) 100 UNIT/ML injection pen Inject 20 Units under the skin into the appropriate area as directed. Back up as needed for pump failure     • insulin lispro (humaLOG) 100 UNIT/ML injection Inject  under the skin 3 (Three) Times a Day Before Meals.     • Insulin Lispro (humaLOG) 100 UNIT/ML injection USE 50 UNITS IN PUMP AS DIRECTED     • Loratadine (CLARITIN PO) Take  by mouth.     • Prenatal Vit-Fe Fumarate-FA (PRENATAL VITAMIN PO) Take  by mouth.     • aspirin 81 MG EC tablet Take 81 mg by mouth Daily.     • Enoxaparin Sodium (LOVENOX) 30 MG/0.3ML solution prefilled syringe syringe ADMINISTER 1 SYRINGE UNDER THE SKIN EVERY DAY AS DIRECTED       No current facility-administered medications on file prior to visit.        Past obstetric, gynecological, medical, surgical, family and social history reviewed.  Relevant lab work and imaging reviewed.    Review of systems  Constitutional:  denies fever, chills, malaise.   ENT/Mouth:  denies sore throat, tinnitis  Eyes: denies vision changes/pain  CV:  denies chest pain  Respiratory:  denies cough/SOB  GI:  denies N/V, diarrhea, abdominal pain.    :   denies dysuria  Skin:  denies lesions or pruritis   Neuro:  denies weakness, focal neurologic symptoms    Vitals:    12/30/22 1101   BP: 127/76   BP Location: Right arm   Patient Position: Sitting   Pulse: 79   Temp: 98 °F (36.7 °C)   TempSrc: Temporal   Weight: 95.6 kg (210 lb 12.8 oz)       PHYSICAL EXAM   GENERAL: Not in acute distress, AAOx3, pleasant  CARDIO: regular rate and rhythm  PULM: symmetric chest rise, speaking in complete sentences without difficulty  NEURO: awake, alert and oriented to person, place, and time  ABDOMINAL: No fundal tenderness, no rebound or guarding,  gravid  EXTREMITIES: no bilateral lower extremity edema/tenderness  SKIN: Warm, well-perfused      ULTRASOUND   Please view full ultrasound note on Imaging tab in ViewPoint.  Cephalic presentation  Posterior placenta  MATTHEW 10.1 cm  BPP         ASSESSMENT/COUNSELIN y.o. G1  P  at 36 6/7 weeks gestation (Estimated Date of Delivery: None noted.).    -Pregnancy  [ X ] stable  [   ] improving [  ] worsening    Diagnoses and all orders for this visit:    1. Type 1 diabetes mellitus affecting pregnancy in second trimester, antepartum (Primary)    2. Factor V Leiden mutation affecting pregnancy (HCC)        -Factor V Leiden heterozygous  She is taking her heparin which she has been switched to      Pregestational DM, Type 1  She is following with her endocrinologist and doing well.  She has a profile set on her pump for after delivery.      She has had a normal ECHO.    All questions and concerns answered.    She is getting nervous about delivery and about baby having a stroke from a shoulder dystocia.  We discussed risks of shoulder dystocia somewhat increased with diabetes and that everyone would be ready for it, however, she has been exceptionally well controlled and had a very normal sized baby--that often with diabetics the abdominal circumference is large driving some of this increased risk and that is not true for her.  We discussed it is difficult to predict shoulder dystocia and I cannot guarantee she will not have one, but most are relieved with 1 or 2 maneuvers and it is rare for a baby to have sequela from them. I reassured her that she has worked really heard with her sugar control and I fully expect her to have a sheldon low risk delivery and that is my hope for her.  We discussed labor and pre-eclampsia precautions.  Her BP is great today.      Summary of Plan  -Tuesday BPP/Thursday NST  -Follow with endo per pt request for H4MB--fs are available at the [facility] office if she changes her mind  -Goal  delivery 38 weeks--pt coming in at 38 and 2 she says which is very appropriate      Follow-up: Twice weekly for  testing given type 1 DM    Thank you for the consult and opportunity to care for this patient.  Please feel free to reach out with any questions or concerns.      I spent 20 minutes caring for this patient on this date of service. This time includes time spent by me in the following activities: preparing for the visit, reviewing tests, obtaining and/or reviewing a separately obtained history, performing a medically appropriate examination and/or evaluation, counseling and educating the patient/family/caregiver and independently interpreting results and communicating that information with the patient/family/caregiver with greater than 50% spent in counseling and coordination of care.     Lawanda Valente MD FACOG  Maternal Fetal Medicine-[facility]  Office: [phone]  [email or web address]

## 2022-12-29 ENCOUNTER — APPOINTMENT (OUTPATIENT)
Dept: ULTRASOUND IMAGING | Facility: HOSPITAL | Age: 32
End: 2022-12-29

## 2022-12-30 ENCOUNTER — APPOINTMENT (OUTPATIENT)
Dept: ULTRASOUND IMAGING | Facility: HOSPITAL | Age: 32
End: 2022-12-30

## 2022-12-30 ENCOUNTER — HOSPITAL ENCOUNTER (OUTPATIENT)
Dept: ULTRASOUND IMAGING | Facility: HOSPITAL | Age: 32
Discharge: HOME | End: 2022-12-30
Admitting: NURSE PRACTITIONER

## 2022-12-30 ENCOUNTER — OFFICE VISIT (OUTPATIENT)
Dept: OBSTETRICS AND GYNECOLOGY | Facility: CLINIC | Age: 32
End: 2022-12-30

## 2022-12-30 VITALS
BODY MASS INDEX: 34.02 KG/M2 | TEMPERATURE: 98 F | HEART RATE: 79 BPM | DIASTOLIC BLOOD PRESSURE: 76 MMHG | SYSTOLIC BLOOD PRESSURE: 127 MMHG | WEIGHT: 210.8 LBS

## 2022-12-30 DIAGNOSIS — O24.012 TYPE 1 DIABETES MELLITUS AFFECTING PREGNANCY IN SECOND TRIMESTER, ANTEPARTUM: Primary | ICD-10-CM

## 2022-12-30 DIAGNOSIS — O24.012 TYPE 1 DIABETES MELLITUS AFFECTING PREGNANCY IN SECOND TRIMESTER, ANTEPARTUM: ICD-10-CM

## 2022-12-30 DIAGNOSIS — O99.119 FACTOR V LEIDEN MUTATION AFFECTING PREGNANCY: ICD-10-CM

## 2022-12-30 DIAGNOSIS — D68.51 FACTOR V LEIDEN MUTATION AFFECTING PREGNANCY: ICD-10-CM

## 2022-12-30 PROCEDURE — 76819 FETAL BIOPHYS PROFIL W/O NST: CPT | Performed by: OBSTETRICS & GYNECOLOGY

## 2022-12-30 PROCEDURE — 76819 FETAL BIOPHYS PROFIL W/O NST: CPT

## 2022-12-30 PROCEDURE — 99213 OFFICE O/P EST LOW 20 MIN: CPT | Performed by: OBSTETRICS & GYNECOLOGY

## 2022-12-30 NOTE — PROGRESS NOTES
Pt reports that she is doing well and denies vaginal bleeding, cramping, contractions or LOF at this time. Reports active fetal movement. Denies HA, visual changes or epigastric pain. Denies any additional complaints at time of appointment. Reports scheduled induction planned for 1/8.      Vitals:    12/30/22 1101   BP: 127/76   Pulse: 79   Temp: 98 °F (36.7 °C)

## 2023-01-03 ENCOUNTER — OFFICE VISIT (OUTPATIENT)
Dept: OBSTETRICS AND GYNECOLOGY | Facility: CLINIC | Age: 33
End: 2023-01-03
Payer: COMMERCIAL

## 2023-01-03 ENCOUNTER — HOSPITAL ENCOUNTER (INPATIENT)
Facility: HOSPITAL | Age: 33
LOS: 5 days | Discharge: HOME | End: 2023-01-08
Attending: OBSTETRICS & GYNECOLOGY | Admitting: OBSTETRICS & GYNECOLOGY
Payer: COMMERCIAL

## 2023-01-03 ENCOUNTER — ANESTHESIA (OUTPATIENT)
Dept: LABOR AND DELIVERY | Facility: HOSPITAL | Age: 33
End: 2023-01-03
Payer: COMMERCIAL

## 2023-01-03 ENCOUNTER — HOSPITAL ENCOUNTER (OUTPATIENT)
Dept: ULTRASOUND IMAGING | Facility: HOSPITAL | Age: 33
Discharge: HOME | End: 2023-01-03
Admitting: NURSE PRACTITIONER
Payer: COMMERCIAL

## 2023-01-03 ENCOUNTER — ANESTHESIA EVENT (OUTPATIENT)
Dept: LABOR AND DELIVERY | Facility: HOSPITAL | Age: 33
End: 2023-01-03
Payer: COMMERCIAL

## 2023-01-03 VITALS
TEMPERATURE: 97.8 F | WEIGHT: 211 LBS | BODY MASS INDEX: 34.06 KG/M2 | DIASTOLIC BLOOD PRESSURE: 78 MMHG | HEART RATE: 100 BPM | SYSTOLIC BLOOD PRESSURE: 109 MMHG

## 2023-01-03 DIAGNOSIS — O24.012 TYPE 1 DIABETES MELLITUS AFFECTING PREGNANCY IN SECOND TRIMESTER, ANTEPARTUM: Primary | ICD-10-CM

## 2023-01-03 DIAGNOSIS — Z3A.37 37 WEEKS GESTATION OF PREGNANCY: Primary | ICD-10-CM

## 2023-01-03 DIAGNOSIS — O99.119 FACTOR V LEIDEN MUTATION AFFECTING PREGNANCY: ICD-10-CM

## 2023-01-03 DIAGNOSIS — D68.51 FACTOR V LEIDEN MUTATION AFFECTING PREGNANCY: ICD-10-CM

## 2023-01-03 DIAGNOSIS — O24.012 TYPE 1 DIABETES MELLITUS AFFECTING PREGNANCY IN SECOND TRIMESTER, ANTEPARTUM: ICD-10-CM

## 2023-01-03 LAB
ABO GROUP BLD: NORMAL
ALBUMIN SERPL-MCNC: 3.3 G/DL (ref 3.5–5.2)
ALBUMIN/GLOB SERPL: 1 G/DL
ALP SERPL-CCNC: 145 U/L (ref 39–117)
ALT SERPL W P-5'-P-CCNC: 13 U/L (ref 1–33)
ANION GAP SERPL CALCULATED.3IONS-SCNC: 10 MMOL/L (ref 5–15)
AST SERPL-CCNC: 22 U/L (ref 1–32)
BASOPHILS # BLD AUTO: 0.05 10*3/MM3 (ref 0–0.2)
BASOPHILS NFR BLD AUTO: 0.4 % (ref 0–1.5)
BILIRUB SERPL-MCNC: 0.2 MG/DL (ref 0–1.2)
BLD GP AB SCN SERPL QL: POSITIVE
BUN SERPL-MCNC: 11 MG/DL (ref 6–20)
BUN/CREAT SERPL: 16.7 (ref 7–25)
CALCIUM SPEC-SCNC: 8.7 MG/DL (ref 8.6–10.5)
CHLORIDE SERPL-SCNC: 109 MMOL/L (ref 98–107)
CO2 SERPL-SCNC: 21 MMOL/L (ref 22–29)
CREAT SERPL-MCNC: 0.66 MG/DL (ref 0.57–1)
DEPRECATED RDW RBC AUTO: 41.7 FL (ref 37–54)
EGFRCR SERPLBLD CKD-EPI 2021: 119 ML/MIN/1.73
EOSINOPHIL # BLD AUTO: 0.08 10*3/MM3 (ref 0–0.4)
EOSINOPHIL NFR BLD AUTO: 0.7 % (ref 0.3–6.2)
ERYTHROCYTE [DISTWIDTH] IN BLOOD BY AUTOMATED COUNT: 13.3 % (ref 12.3–15.4)
GLOBULIN UR ELPH-MCNC: 3.3 GM/DL
GLUCOSE SERPL-MCNC: 45 MG/DL (ref 65–99)
HCT VFR BLD AUTO: 35.1 % (ref 34–46.6)
HGB BLD-MCNC: 11.9 G/DL (ref 12–15.9)
IMM GRANULOCYTES # BLD AUTO: 0.16 10*3/MM3 (ref 0–0.05)
IMM GRANULOCYTES NFR BLD AUTO: 1.3 % (ref 0–0.5)
LYMPHOCYTES # BLD AUTO: 1.89 10*3/MM3 (ref 0.7–3.1)
LYMPHOCYTES NFR BLD AUTO: 15.5 % (ref 19.6–45.3)
MCH RBC QN AUTO: 29.2 PG (ref 26.6–33)
MCHC RBC AUTO-ENTMCNC: 33.9 G/DL (ref 31.5–35.7)
MCV RBC AUTO: 86.2 FL (ref 79–97)
MONOCYTES # BLD AUTO: 0.81 10*3/MM3 (ref 0.1–0.9)
MONOCYTES NFR BLD AUTO: 6.6 % (ref 5–12)
NEUTROPHILS NFR BLD AUTO: 75.5 % (ref 42.7–76)
NEUTROPHILS NFR BLD AUTO: 9.24 10*3/MM3 (ref 1.7–7)
NRBC BLD AUTO-RTO: 0.1 /100 WBC (ref 0–0.2)
PLATELET # BLD AUTO: 234 10*3/MM3 (ref 140–450)
PMV BLD AUTO: 11.1 FL (ref 6–12)
POTASSIUM SERPL-SCNC: 3.8 MMOL/L (ref 3.5–5.2)
PROT SERPL-MCNC: 6.6 G/DL (ref 6–8.5)
RBC # BLD AUTO: 4.07 10*6/MM3 (ref 3.77–5.28)
RESIDUAL RHIG DETECTED: NORMAL
RH BLD: NEGATIVE
SODIUM SERPL-SCNC: 140 MMOL/L (ref 136–145)
T&S EXPIRATION DATE: NORMAL
WBC NRBC COR # BLD: 12.23 10*3/MM3 (ref 3.4–10.8)

## 2023-01-03 PROCEDURE — 86901 BLOOD TYPING SEROLOGIC RH(D): CPT | Performed by: OBSTETRICS & GYNECOLOGY

## 2023-01-03 PROCEDURE — 99213 OFFICE O/P EST LOW 20 MIN: CPT | Performed by: NURSE PRACTITIONER

## 2023-01-03 PROCEDURE — 86850 RBC ANTIBODY SCREEN: CPT | Performed by: OBSTETRICS & GYNECOLOGY

## 2023-01-03 PROCEDURE — 76819 FETAL BIOPHYS PROFIL W/O NST: CPT

## 2023-01-03 PROCEDURE — 86870 RBC ANTIBODY IDENTIFICATION: CPT | Performed by: OBSTETRICS & GYNECOLOGY

## 2023-01-03 PROCEDURE — 80053 COMPREHEN METABOLIC PANEL: CPT | Performed by: OBSTETRICS & GYNECOLOGY

## 2023-01-03 PROCEDURE — 85025 COMPLETE CBC W/AUTO DIFF WBC: CPT | Performed by: OBSTETRICS & GYNECOLOGY

## 2023-01-03 PROCEDURE — 76819 FETAL BIOPHYS PROFIL W/O NST: CPT | Performed by: OBSTETRICS & GYNECOLOGY

## 2023-01-03 PROCEDURE — 86900 BLOOD TYPING SEROLOGIC ABO: CPT | Performed by: OBSTETRICS & GYNECOLOGY

## 2023-01-03 RX ORDER — MAGNESIUM CARB/ALUMINUM HYDROX 105-160MG
30 TABLET,CHEWABLE ORAL ONCE AS NEEDED
Status: DISCONTINUED | OUTPATIENT
Start: 2023-01-03 | End: 2023-01-04

## 2023-01-03 RX ORDER — FAMOTIDINE 10 MG/ML
20 INJECTION, SOLUTION INTRAVENOUS 2 TIMES DAILY PRN
Status: DISCONTINUED | OUTPATIENT
Start: 2023-01-03 | End: 2023-01-04

## 2023-01-03 RX ORDER — TERBUTALINE SULFATE 1 MG/ML
0.25 INJECTION, SOLUTION SUBCUTANEOUS AS NEEDED
Status: DISCONTINUED | OUTPATIENT
Start: 2023-01-03 | End: 2023-01-04

## 2023-01-03 RX ORDER — FAMOTIDINE 20 MG/1
20 TABLET, FILM COATED ORAL 2 TIMES DAILY PRN
Status: DISCONTINUED | OUTPATIENT
Start: 2023-01-03 | End: 2023-01-04

## 2023-01-03 RX ORDER — ONDANSETRON 2 MG/ML
4 INJECTION INTRAMUSCULAR; INTRAVENOUS EVERY 6 HOURS PRN
Status: DISCONTINUED | OUTPATIENT
Start: 2023-01-03 | End: 2023-01-04

## 2023-01-03 RX ORDER — ACETAMINOPHEN 325 MG/1
650 TABLET ORAL EVERY 4 HOURS PRN
Status: DISCONTINUED | OUTPATIENT
Start: 2023-01-03 | End: 2023-01-04

## 2023-01-03 RX ORDER — SODIUM CHLORIDE, SODIUM LACTATE, POTASSIUM CHLORIDE, CALCIUM CHLORIDE 600; 310; 30; 20 MG/100ML; MG/100ML; MG/100ML; MG/100ML
125 INJECTION, SOLUTION INTRAVENOUS CONTINUOUS
Status: DISCONTINUED | OUTPATIENT
Start: 2023-01-03 | End: 2023-01-04

## 2023-01-03 RX ORDER — ONDANSETRON 4 MG/1
4 TABLET, FILM COATED ORAL EVERY 6 HOURS PRN
Status: DISCONTINUED | OUTPATIENT
Start: 2023-01-03 | End: 2023-01-04

## 2023-01-03 RX ADMIN — SODIUM CHLORIDE, POTASSIUM CHLORIDE, SODIUM LACTATE AND CALCIUM CHLORIDE 125 ML/HR: 600; 310; 30; 20 INJECTION, SOLUTION INTRAVENOUS at 15:32

## 2023-01-03 RX ADMIN — SODIUM CHLORIDE, POTASSIUM CHLORIDE, SODIUM LACTATE AND CALCIUM CHLORIDE 125 ML/HR: 600; 310; 30; 20 INJECTION, SOLUTION INTRAVENOUS at 22:41

## 2023-01-03 RX ADMIN — DINOPROSTONE 10 MG: 10 INSERT VAGINAL at 17:05

## 2023-01-03 NOTE — PROGRESS NOTES
Pt reports that she is doing well and denies vaginal bleeding or LOF at this time. Notes irregular lower abdominal cramping, denies consistency. Reports active fetal movement. Denies HA, visual changes or epigastric pain. Denies any additional complaints at time of appointment. PM induction scheduled for 1/8.    Vitals:    01/03/23 1057   BP: 109/78   Pulse: 100   Temp: 97.8 °F (36.6 °C)

## 2023-01-03 NOTE — PROGRESS NOTES
MATERNAL FETAL MEDICINE F/U Note    Dear Dr Melissa Leonard MD:    Thank you for your kind referral of Pat Finley.  As you know, she is a 32 y.o.    at   37w3d  (Estimated Date of Delivery: 23). This is a follow-up.      Her antepartum course is complicated by:  Type 1 DM  Factor V Leiden Heterozygous with FH PE    Aneuploidy Screening:    low risk panorama male    HPI: Today, she denies headache, blurry vision, RUQ pain. No vaginal bleeding, vaginal leakage, no contractions and endorses positive fetal movement.      Review of History:  Past Medical History:   Diagnosis Date   • Diabetes type I (HCC)     [facility]- Dr. Win and Anita MA   • Heterozygous factor V Leiden mutation (HCC)    •  product of IVF pregnancy      Past Surgical History:   Procedure Laterality Date   • EYE SURGERY      Laser eye treatment-diabetic retinopathy       OB Hx:     Social History     Socioeconomic History   • Marital status:    Tobacco Use   • Smoking status: Never   • Smokeless tobacco: Never   Vaping Use   • Vaping Use: Never used   Substance and Sexual Activity   • Alcohol use: Not Currently   • Drug use: Never   • Sexual activity: Yes     Partners: Male     Family History   Problem Relation Age of Onset   • Factor V Leiden deficiency Father    • Pulmonary embolism Father    • Diabetes Other         Maternal      Allergies   Allergen Reactions   • Adhesive Tape Rash     Very sensitive      No current facility-administered medications on file prior to visit.     Current Outpatient Medications on File Prior to Visit   Medication Sig Dispense Refill   • aspirin (aspirin) 81 MG EC tablet Take 1 tablet by mouth Daily. 90 tablet 4   • aspirin 81 MG EC tablet Take 81 mg by mouth Daily.     • calcium carbonate (TUMS) 500 MG chewable tablet Chew 2 tablets 2 (Two) Times a Day.     • Continuous Blood Gluc Sensor (Dexcom G6 Sensor) APPLY 1 SENSOR EVERY 10 DAYS     •  Continuous Blood Gluc Transmit (Dexcom G6 Transmitter) misc See Admin Instructions.     • Enoxaparin Sodium (LOVENOX) 30 MG/0.3ML solution prefilled syringe syringe ADMINISTER 1 SYRINGE UNDER THE SKIN EVERY DAY AS DIRECTED     • famotidine (Pepcid) 20 MG tablet Take 1 tablet by mouth Daily. 30 tablet 5   • FIBER PO Take 2 capsules by mouth.     • Heparin Sodium, Porcine, (heparin, porcine,) 5000 UNIT/ML injection Inject 2 mL under the skin into the appropriate area as directed Every 12 (Twelve) Hours for 120 days. 120 mL 3   • Insulin Glargine (LANTUS SOLOSTAR) 100 UNIT/ML injection pen Inject 20 Units under the skin into the appropriate area as directed. Back up as needed for pump failure     • insulin lispro (humaLOG) 100 UNIT/ML injection Inject  under the skin 3 (Three) Times a Day Before Meals.     • Insulin Lispro (humaLOG) 100 UNIT/ML injection USE 50 UNITS IN PUMP AS DIRECTED     • Loratadine (CLARITIN PO) Take  by mouth.     • Prenatal Vit-Fe Fumarate-FA (PRENATAL VITAMIN PO) Take  by mouth.          Past obstetric, gynecological, medical, surgical, family and social history reviewed.  Relevant lab work and imaging reviewed.    Review of systems  Constitutional:  denies fever, chills, malaise.   ENT/Mouth:  denies sore throat, tinnitis  Eyes: denies vision changes/pain  CV:  denies chest pain  Respiratory:  denies cough/SOB  GI:  denies N/V, diarrhea, abdominal pain.    :   denies dysuria  Skin:  denies lesions or pruritis   Neuro:  denies weakness, focal neurologic symptoms    Vitals:    01/03/23 1057   BP: 109/78   BP Location: Right arm   Patient Position: Sitting   Pulse: 100   Temp: 97.8 °F (36.6 °C)   TempSrc: Temporal   Weight: 95.7 kg (211 lb)       PHYSICAL EXAM   GENERAL: Not in acute distress, AAOx3, pleasant  CARDIO: regular rate and rhythm  PULM: symmetric chest rise, speaking in complete sentences without difficulty  NEURO: awake, alert and oriented to person, place, and time  ABDOMINAL: No  fundal tenderness, no rebound or guarding, gravid  EXTREMITIES: no bilateral lower extremity edema/tenderness  SKIN: Warm, well-perfused      ULTRASOUND   Please view full ultrasound note on Imaging tab in ViewPoint.  Pt had a BPP of 6/8 for tone and MATTHEW went to 5.8cm from 6.0 last week.  Fetal dopplers were normal.    Reviewed with Dr. Valente and she recommended delivery at this time.         Biophysical Profile  ===============     2: Fetal breathing movements  2: Gross body movements  0: Fetal tone  2: Amniotic fluid volume  6/8 Biophysical profile score        Fetal Doppler  ===========     Arterial  Umbilical A PI    0.80  52%        Sebastian     Umbilical A RI    0.56  52%        Sebastian     Umbilical A PS   54.29 cm/s  66%        Ebbing     Umbilical A ED  24.04 cm/s  Umbilical A TAmax         38.86 cm/s  71%        Ebbing     Umbilical A MD  23.92 cm/s  Umbilical A S / D            2.28  47%        Sebastian     Umbilical A HR  155 bpm        Impression  =========     Cephalic presentation.  Posterior placenta  MATTHEW 5.8 cm, low normal with 2x2 pocket.  BPP 6/8 for lack of tone        Recommendation  ===============     Sent to L&D for delivery given term gestation.      ASSESSMENT/COUNSELIN y.o.  at  37w3d  (Estimated Date of Delivery: 23)    -Pregnancy  [ X ] stable  [   ] improving [  ] worsening    Diagnoses and all orders for this visit:    1. Type 1 diabetes mellitus affecting pregnancy in second trimester, antepartum (Primary)    2. Factor V Leiden mutation affecting pregnancy (HCC)      -Factor V Leiden heterozygous  She is taking her heparin which she has been switched to a few weeks ago.       Pregestational DM, Type 1  She is following with her endocrinologist and doing well.  She has a profile set on her pump for after delivery.       She has had a normal ECHO.     All questions and concerns answered.    Recommending delivery after today's visit at [facility] based on BPP and decreasing MATTHEW  and pt prenatal hx.   Discussed plan of care with pt and she verbalized understanding.  Discussed with OB office and L&D as well.  Pt to report after visit today and has no further questions at this time.        Summary of Plan  -Tuesday BPP/Thursday NST  -Follow with endo per pt request for [id number]--ms are available at the [facility] office if she changes her mind  -Goal delivery:  Recommending delivery after today's visit at [facility] based on BPP and decreasing MATTHEW and pt prenatal hx.        Follow-up: Pt is on the schedule for next week which could be cancelled if she has delivered.      Thank you for the consult and opportunity to care for this patient.  Please feel free to reach out with any questions or concerns.          ABIOLA Montes, MARCELINO-BC  Maternal Fetal Medicine-[facility]  Office: [phone]  [email or web address]

## 2023-01-04 LAB
ABO GROUP BLD: NORMAL
RH BLD: NEGATIVE

## 2023-01-04 PROCEDURE — 25010000002 FENTANYL CITRATE (PF) 50 MCG/ML SOLUTION: Performed by: ANESTHESIOLOGY

## 2023-01-04 PROCEDURE — 25010000002 KETOROLAC TROMETHAMINE PER 15 MG: Performed by: OBSTETRICS & GYNECOLOGY

## 2023-01-04 PROCEDURE — 25010000002 ONDANSETRON PER 1 MG: Performed by: STUDENT IN AN ORGANIZED HEALTH CARE EDUCATION/TRAINING PROGRAM

## 2023-01-04 PROCEDURE — 25010000002 MORPHINE PER 10 MG: Performed by: ANESTHESIOLOGY

## 2023-01-04 PROCEDURE — 86900 BLOOD TYPING SEROLOGIC ABO: CPT | Performed by: OBSTETRICS & GYNECOLOGY

## 2023-01-04 PROCEDURE — 25010000002 CEFAZOLIN IN DEXTROSE 2-4 GM/100ML-% SOLUTION: Performed by: OBSTETRICS & GYNECOLOGY

## 2023-01-04 PROCEDURE — 86901 BLOOD TYPING SEROLOGIC RH(D): CPT | Performed by: OBSTETRICS & GYNECOLOGY

## 2023-01-04 PROCEDURE — 82803 BLOOD GASES ANY COMBINATION: CPT

## 2023-01-04 PROCEDURE — 88307 TISSUE EXAM BY PATHOLOGIST: CPT

## 2023-01-04 PROCEDURE — 85461 HEMOGLOBIN FETAL: CPT | Performed by: OBSTETRICS & GYNECOLOGY

## 2023-01-04 PROCEDURE — 25010000002 OXYTOCIN PER 10 UNITS: Performed by: OBSTETRICS & GYNECOLOGY

## 2023-01-04 RX ORDER — FENTANYL CITRATE 50 UG/ML
INJECTION, SOLUTION INTRAMUSCULAR; INTRAVENOUS
Status: COMPLETED | OUTPATIENT
Start: 2023-01-04 | End: 2023-01-04

## 2023-01-04 RX ORDER — MISOPROSTOL 200 UG/1
800 TABLET ORAL ONCE AS NEEDED
Status: DISCONTINUED | OUTPATIENT
Start: 2023-01-04 | End: 2023-01-04

## 2023-01-04 RX ORDER — NICOTINE POLACRILEX 4 MG
15 LOZENGE BUCCAL
Status: DISCONTINUED | OUTPATIENT
Start: 2023-01-04 | End: 2023-01-04

## 2023-01-04 RX ORDER — ACETAMINOPHEN 500 MG
1000 TABLET ORAL ONCE
Status: COMPLETED | OUTPATIENT
Start: 2023-01-04 | End: 2023-01-04

## 2023-01-04 RX ORDER — KETOROLAC TROMETHAMINE 30 MG/ML
30 INJECTION, SOLUTION INTRAMUSCULAR; INTRAVENOUS ONCE
Status: COMPLETED | OUTPATIENT
Start: 2023-01-04 | End: 2023-01-04

## 2023-01-04 RX ORDER — SODIUM CHLORIDE 0.9 % (FLUSH) 0.9 %
3 SYRINGE (ML) INJECTION EVERY 12 HOURS SCHEDULED
Status: DISCONTINUED | OUTPATIENT
Start: 2023-01-04 | End: 2023-01-08 | Stop reason: HOSPADM

## 2023-01-04 RX ORDER — HYDROMORPHONE HYDROCHLORIDE 1 MG/ML
0.25 INJECTION, SOLUTION INTRAMUSCULAR; INTRAVENOUS; SUBCUTANEOUS
Status: DISCONTINUED | OUTPATIENT
Start: 2023-01-04 | End: 2023-01-08 | Stop reason: HOSPADM

## 2023-01-04 RX ORDER — CEFAZOLIN SODIUM 2 G/100ML
2 INJECTION, SOLUTION INTRAVENOUS ONCE
Status: COMPLETED | OUTPATIENT
Start: 2023-01-04 | End: 2023-01-04

## 2023-01-04 RX ORDER — OXYCODONE HYDROCHLORIDE 10 MG/1
10 TABLET ORAL EVERY 4 HOURS PRN
Status: DISCONTINUED | OUTPATIENT
Start: 2023-01-04 | End: 2023-01-08 | Stop reason: HOSPADM

## 2023-01-04 RX ORDER — NALOXONE HCL 0.4 MG/ML
0.4 VIAL (ML) INJECTION
Status: ACTIVE | OUTPATIENT
Start: 2023-01-04 | End: 2023-01-05

## 2023-01-04 RX ORDER — DIPHENHYDRAMINE HYDROCHLORIDE 50 MG/ML
25 INJECTION INTRAMUSCULAR; INTRAVENOUS EVERY 4 HOURS PRN
Status: DISCONTINUED | OUTPATIENT
Start: 2023-01-04 | End: 2023-01-08 | Stop reason: HOSPADM

## 2023-01-04 RX ORDER — TRANEXAMIC ACID 10 MG/ML
1000 INJECTION, SOLUTION INTRAVENOUS ONCE AS NEEDED
Status: DISCONTINUED | OUTPATIENT
Start: 2023-01-04 | End: 2023-01-04

## 2023-01-04 RX ORDER — OXYTOCIN/0.9 % SODIUM CHLORIDE 30/500 ML
250 PLASTIC BAG, INJECTION (ML) INTRAVENOUS CONTINUOUS
Status: DISPENSED | OUTPATIENT
Start: 2023-01-04 | End: 2023-01-04

## 2023-01-04 RX ORDER — HYDROXYZINE 50 MG/1
50 TABLET, FILM COATED ORAL EVERY 6 HOURS PRN
Status: DISCONTINUED | OUTPATIENT
Start: 2023-01-04 | End: 2023-01-08 | Stop reason: HOSPADM

## 2023-01-04 RX ORDER — FAMOTIDINE 10 MG/ML
20 INJECTION, SOLUTION INTRAVENOUS ONCE AS NEEDED
Status: DISCONTINUED | OUTPATIENT
Start: 2023-01-04 | End: 2023-01-04

## 2023-01-04 RX ORDER — FAMOTIDINE 10 MG/ML
20 INJECTION, SOLUTION INTRAVENOUS ONCE AS NEEDED
Status: COMPLETED | OUTPATIENT
Start: 2023-01-04 | End: 2023-01-04

## 2023-01-04 RX ORDER — SODIUM CHLORIDE 0.9 % (FLUSH) 0.9 %
3-10 SYRINGE (ML) INJECTION AS NEEDED
Status: DISCONTINUED | OUTPATIENT
Start: 2023-01-04 | End: 2023-01-08 | Stop reason: HOSPADM

## 2023-01-04 RX ORDER — KETOROLAC TROMETHAMINE 15 MG/ML
15 INJECTION, SOLUTION INTRAMUSCULAR; INTRAVENOUS EVERY 6 HOURS
Status: COMPLETED | OUTPATIENT
Start: 2023-01-05 | End: 2023-01-05

## 2023-01-04 RX ORDER — DEXTROSE MONOHYDRATE 25 G/50ML
25 INJECTION, SOLUTION INTRAVENOUS
Status: DISCONTINUED | OUTPATIENT
Start: 2023-01-04 | End: 2023-01-04

## 2023-01-04 RX ORDER — IBUPROFEN 600 MG/1
600 TABLET ORAL EVERY 6 HOURS
Status: DISCONTINUED | OUTPATIENT
Start: 2023-01-06 | End: 2023-01-08 | Stop reason: HOSPADM

## 2023-01-04 RX ORDER — SIMETHICONE 80 MG
80 TABLET,CHEWABLE ORAL 4 TIMES DAILY PRN
Status: DISCONTINUED | OUTPATIENT
Start: 2023-01-04 | End: 2023-01-08 | Stop reason: HOSPADM

## 2023-01-04 RX ORDER — DOCUSATE SODIUM 100 MG/1
100 CAPSULE, LIQUID FILLED ORAL 2 TIMES DAILY
Status: DISCONTINUED | OUTPATIENT
Start: 2023-01-04 | End: 2023-01-08 | Stop reason: HOSPADM

## 2023-01-04 RX ORDER — HYDROCORTISONE 25 MG/G
CREAM TOPICAL 3 TIMES DAILY PRN
Status: DISCONTINUED | OUTPATIENT
Start: 2023-01-04 | End: 2023-01-08 | Stop reason: HOSPADM

## 2023-01-04 RX ORDER — ACETAMINOPHEN 500 MG
1000 TABLET ORAL EVERY 6 HOURS
Status: COMPLETED | OUTPATIENT
Start: 2023-01-04 | End: 2023-01-05

## 2023-01-04 RX ORDER — MORPHINE SULFATE 1 MG/ML
INJECTION, SOLUTION EPIDURAL; INTRATHECAL; INTRAVENOUS
Status: COMPLETED | OUTPATIENT
Start: 2023-01-04 | End: 2023-01-04

## 2023-01-04 RX ORDER — ONDANSETRON 4 MG/1
4 TABLET, FILM COATED ORAL EVERY 8 HOURS PRN
Status: DISCONTINUED | OUTPATIENT
Start: 2023-01-04 | End: 2023-01-08 | Stop reason: HOSPADM

## 2023-01-04 RX ORDER — BUPIVACAINE HYDROCHLORIDE 7.5 MG/ML
INJECTION, SOLUTION EPIDURAL; RETROBULBAR
Status: COMPLETED | OUTPATIENT
Start: 2023-01-04 | End: 2023-01-04

## 2023-01-04 RX ORDER — ACETAMINOPHEN 325 MG/1
650 TABLET ORAL EVERY 6 HOURS
Status: DISCONTINUED | OUTPATIENT
Start: 2023-01-06 | End: 2023-01-08 | Stop reason: HOSPADM

## 2023-01-04 RX ORDER — OXYTOCIN/0.9 % SODIUM CHLORIDE 30/500 ML
999 PLASTIC BAG, INJECTION (ML) INTRAVENOUS ONCE
Status: COMPLETED | OUTPATIENT
Start: 2023-01-04 | End: 2023-01-04

## 2023-01-04 RX ORDER — PROMETHAZINE HYDROCHLORIDE 12.5 MG/1
12.5 SUPPOSITORY RECTAL EVERY 6 HOURS PRN
Status: DISCONTINUED | OUTPATIENT
Start: 2023-01-04 | End: 2023-01-08 | Stop reason: HOSPADM

## 2023-01-04 RX ORDER — METHYLERGONOVINE MALEATE 0.2 MG/ML
200 INJECTION INTRAVENOUS ONCE AS NEEDED
Status: DISCONTINUED | OUTPATIENT
Start: 2023-01-04 | End: 2023-01-04

## 2023-01-04 RX ORDER — SODIUM CHLORIDE, SODIUM LACTATE, POTASSIUM CHLORIDE, CALCIUM CHLORIDE 600; 310; 30; 20 MG/100ML; MG/100ML; MG/100ML; MG/100ML
INJECTION, SOLUTION INTRAVENOUS CONTINUOUS PRN
Status: DISCONTINUED | OUTPATIENT
Start: 2023-01-04 | End: 2023-01-04 | Stop reason: SURG

## 2023-01-04 RX ORDER — PROMETHAZINE HYDROCHLORIDE 25 MG/1
25 TABLET ORAL EVERY 6 HOURS PRN
Status: DISCONTINUED | OUTPATIENT
Start: 2023-01-04 | End: 2023-01-08 | Stop reason: HOSPADM

## 2023-01-04 RX ORDER — ONDANSETRON 2 MG/ML
4 INJECTION INTRAMUSCULAR; INTRAVENOUS ONCE AS NEEDED
Status: ACTIVE | OUTPATIENT
Start: 2023-01-04 | End: 2023-01-05

## 2023-01-04 RX ORDER — OXYTOCIN/0.9 % SODIUM CHLORIDE 30/500 ML
2-20 PLASTIC BAG, INJECTION (ML) INTRAVENOUS
Status: DISCONTINUED | OUTPATIENT
Start: 2023-01-04 | End: 2023-01-04

## 2023-01-04 RX ORDER — OXYCODONE HYDROCHLORIDE 5 MG/1
5 TABLET ORAL EVERY 4 HOURS PRN
Status: DISCONTINUED | OUTPATIENT
Start: 2023-01-04 | End: 2023-01-08 | Stop reason: HOSPADM

## 2023-01-04 RX ORDER — ONDANSETRON 2 MG/ML
4 INJECTION INTRAMUSCULAR; INTRAVENOUS ONCE AS NEEDED
Status: COMPLETED | OUTPATIENT
Start: 2023-01-04 | End: 2023-01-04

## 2023-01-04 RX ORDER — ONDANSETRON 2 MG/ML
4 INJECTION INTRAMUSCULAR; INTRAVENOUS EVERY 8 HOURS PRN
Status: DISCONTINUED | OUTPATIENT
Start: 2023-01-04 | End: 2023-01-08 | Stop reason: HOSPADM

## 2023-01-04 RX ORDER — HEPARIN SODIUM 5000 [USP'U]/ML
5000 INJECTION, SOLUTION INTRAVENOUS; SUBCUTANEOUS EVERY 12 HOURS SCHEDULED
Status: DISCONTINUED | OUTPATIENT
Start: 2023-01-04 | End: 2023-01-06

## 2023-01-04 RX ORDER — CARBOPROST TROMETHAMINE 250 UG/ML
250 INJECTION, SOLUTION INTRAMUSCULAR
Status: DISCONTINUED | OUTPATIENT
Start: 2023-01-04 | End: 2023-01-04

## 2023-01-04 RX ORDER — PHENYLEPHRINE HCL IN 0.9% NACL 1 MG/10 ML
SYRINGE (ML) INTRAVENOUS AS NEEDED
Status: DISCONTINUED | OUTPATIENT
Start: 2023-01-04 | End: 2023-01-04 | Stop reason: SURG

## 2023-01-04 RX ORDER — DIPHENHYDRAMINE HCL 25 MG
25 CAPSULE ORAL EVERY 4 HOURS PRN
Status: DISCONTINUED | OUTPATIENT
Start: 2023-01-04 | End: 2023-01-08 | Stop reason: HOSPADM

## 2023-01-04 RX ORDER — SODIUM CHLORIDE 9 MG/ML
40 INJECTION, SOLUTION INTRAVENOUS AS NEEDED
Status: DISCONTINUED | OUTPATIENT
Start: 2023-01-04 | End: 2023-01-08 | Stop reason: HOSPADM

## 2023-01-04 RX ADMIN — FENTANYL CITRATE 25 MCG: 0.05 INJECTION, SOLUTION INTRAMUSCULAR; INTRAVENOUS at 17:11

## 2023-01-04 RX ADMIN — SODIUM CHLORIDE, POTASSIUM CHLORIDE, SODIUM LACTATE AND CALCIUM CHLORIDE 125 ML/HR: 600; 310; 30; 20 INJECTION, SOLUTION INTRAVENOUS at 06:14

## 2023-01-04 RX ADMIN — SODIUM CHLORIDE, POTASSIUM CHLORIDE, SODIUM LACTATE AND CALCIUM CHLORIDE 125 ML/HR: 600; 310; 30; 20 INJECTION, SOLUTION INTRAVENOUS at 14:32

## 2023-01-04 RX ADMIN — OXYTOCIN 125 MILLI-UNITS/MIN: 10 INJECTION, SOLUTION INTRAMUSCULAR; INTRAVENOUS at 19:17

## 2023-01-04 RX ADMIN — Medication 100 MCG: at 17:28

## 2023-01-04 RX ADMIN — Medication 999 ML/HR: at 17:40

## 2023-01-04 RX ADMIN — SODIUM CHLORIDE, POTASSIUM CHLORIDE, SODIUM LACTATE AND CALCIUM CHLORIDE: 600; 310; 30; 20 INJECTION, SOLUTION INTRAVENOUS at 17:04

## 2023-01-04 RX ADMIN — DEXTROSE MONOHYDRATE 25 G: 25 INJECTION, SOLUTION INTRAVENOUS at 16:01

## 2023-01-04 RX ADMIN — ACETAMINOPHEN 1000 MG: 500 TABLET ORAL at 14:02

## 2023-01-04 RX ADMIN — BUPIVACAINE HYDROCHLORIDE 1.6 ML: 7.5 INJECTION, SOLUTION EPIDURAL; RETROBULBAR at 17:09

## 2023-01-04 RX ADMIN — MORPHINE SULFATE 200 MCG: 1 INJECTION, SOLUTION EPIDURAL; INTRATHECAL; INTRAVENOUS at 17:09

## 2023-01-04 RX ADMIN — CEFAZOLIN SODIUM 2 G: 2 INJECTION, SOLUTION INTRAVENOUS at 15:22

## 2023-01-04 RX ADMIN — FAMOTIDINE 20 MG: 10 INJECTION INTRAVENOUS at 15:17

## 2023-01-04 RX ADMIN — ACETAMINOPHEN 1000 MG: 500 TABLET ORAL at 21:37

## 2023-01-04 RX ADMIN — Medication 100 MCG: at 17:22

## 2023-01-04 RX ADMIN — DOCUSATE SODIUM 100 MG: 100 CAPSULE, LIQUID FILLED ORAL at 21:37

## 2023-01-04 RX ADMIN — Medication 150 MCG: at 17:13

## 2023-01-04 RX ADMIN — ONDANSETRON 4 MG: 2 INJECTION INTRAMUSCULAR; INTRAVENOUS at 15:18

## 2023-01-04 RX ADMIN — KETOROLAC TROMETHAMINE 30 MG: 30 INJECTION, SOLUTION INTRAMUSCULAR; INTRAVENOUS at 17:58

## 2023-01-04 RX ADMIN — Medication 100 MCG: at 17:17

## 2023-01-04 RX ADMIN — Medication 100 MCG: at 17:34

## 2023-01-04 NOTE — H&P
[facility]  Obstetric History and Physical    Patient Name: Pat Finley  :  1990  MRN:  [id number]      Chief Complaint   Patient presents with   • Non-stress Test     Pt. Sent from [facility] for a BPP  for no tone and decreasing fluid levels. + fetal movement. Denies contractions.        Subjective     Patient is a 32 y.o. female  currently at 37w3d, who presents from maternal-fetal medicine where she was having routine ultrasound.  Patient has been followed in this pregnancy with insulin-dependent diabetes.  Biophysical profile was 6 out of 8.  This was offered fetal tone.  NST is reactive.  Patient's fluid volume has been in the low normal range but has been stable over the past week.  Patient was sent to labor and delivery by [facility] for induction of labor..            Objective       Vital Signs Range for the last 24 hours  Temperature: Temp:  [97.8 °F (36.6 °C)-98.3 °F (36.8 °C)] 98 °F (36.7 °C)   Temp Source: Temp src: Oral   BP: BP: (109-128)/(74-78) 128/74   Pulse: Heart Rate:  [] 95   Respirations: Resp:  [16] 16                   Physical Examination:     General :  Alert in NAD  Abdomen: Gravid, NT        Cervix: Exam by: Method: sterile exam per RN   Dilation: Cervical Dilation (cm): 0   Effacement: Cervical Effacement: 0%   Station: Fetal Station: -3       Fetal Heart Rate Assessment   Method: Fetal HR Assessment Method: external   Beats/min: Fetal HR (beats/min): 140   Baseline: Fetal HR Baseline: normal range   Varibility: Fetal HR Variability: moderate (amplitude range 6 to 25 bpm)   Accels: Fetal HR Accelerations: greater than/equal to 15 bpm, lasting at least 15 seconds   Decels: Fetal HR Decelerations: absent   Tracing Category:       Uterine Assessment   Method: Method: external tocotransducer   Frequency (min): Contraction Frequency (Minutes): 2-5   Ctx Count in 10 min:     Duration:     Intensity: Contraction Intensity: mild by palpation   Intensity by IUPC:     Resting Tone:  Uterine Resting Tone: soft by palpation   Resting Tone by IUPC:     [place] Units:           Results from last 7 days   Lab Units 01/03/23  1531   WBC 10*3/mm3 12.23*   HEMOGLOBIN g/dL 11.9*   HEMATOCRIT % 35.1   PLATELETS 10*3/mm3 234       Assessment & Plan     1.  Intrauterine pregnancy at 37w3d weeks gestation for induction of labor at recommendation of maternal-fetal medicine.  Patient cervix is unfavorable.  Will treat with Cervidil for cervical ripening.   reactive fetal status.    Plan of care has been reviewed with patient and family.  All questions answered.      Pregnancy        H&P updated. The patient was examined and the following changes are noted above.         Reema Monteiro MD  1/3/2023  20:09 EST

## 2023-01-04 NOTE — ANESTHESIA PROCEDURE NOTES
Spinal Block      Patient reassessed immediately prior to procedure    Patient location during procedure: OB  Start Time: 1/4/2023 5:09 PM  Stop Time: 1/4/2023 5:11 PM  Performed By  Anesthesiologist: Melissa Leahy MD  Preanesthetic Checklist  Completed: patient identified, IV checked, site marked, risks and benefits discussed, surgical consent, monitors and equipment checked, pre-op evaluation and timeout performed  Spinal Block Prep:  Patient Position:sitting  Sterile Tech:cap, gloves, mask and sterile barriers  Prep:Chloraprep  Patient Monitoring:blood pressure monitoring, continuous pulse oximetry and EKG    Spinal Block Procedure  Approach:midline  Guidance:landmark technique  Location:L3-L4  Needle Type:Rodney  Needle Gauge:24 G  Placement of Spinal needle event:cerebrospinal fluid aspirated    Fluid Appearance:clear  Medications: fentaNYL citrate (PF) (SUBLIMAZE) injection - Intrathecal   25 mcg - 1/4/2023 5:11:00 PM  Morphine PF injection - Intrathecal   200 mcg - 1/4/2023 5:09:00 PM  bupivacaine PF (MARCAINE) 0.75 % injection - Spinal   1.6 mL - 1/4/2023 5:09:00 PM   Post Assessment  Patient Tolerance:patient tolerated the procedure well with no apparent complications  Complications no

## 2023-01-04 NOTE — ANESTHESIA PREPROCEDURE EVALUATION
Anesthesia Evaluation     Patient summary reviewed and Nursing notes reviewed   history of anesthetic complications: PONV               Airway   Mallampati: II  TM distance: >3 FB  Neck ROM: full  Dental - normal exam     Pulmonary - negative pulmonary ROS   Cardiovascular   Exercise tolerance: good (4-7 METS)        Neuro/Psych- negative ROS  GI/Hepatic/Renal/Endo    (+) obesity,   diabetes mellitus type 1 using insulin,     Musculoskeletal     Abdominal    Substance History      OB/GYN    (+) Pregnant,         Other        ROS/Med Hx Other: Factor V Leiden -- takes lovenox; >24 hrs since last dose                  Anesthesia Plan    ASA 2     spinal     (37w4d    I have reviewed the patient's history with the patient and the chart, including all pertinent laboratory results and imaging. Risks of neuraxial anesthesia were discussed with patient including but not limited to: inadequate block, bleeding, infection, persistent numbness or weakness, nerve damage, painful dysesthesia and spinal headache.    Ate at 0700. Plan eCS at 1500.)    Anesthetic plan, risks, benefits, and alternatives have been provided, discussed and informed consent has been obtained with: patient.        CODE STATUS:    Level Of Support Discussed With: Patient  Code Status (Patient has no pulse and is not breathing): CPR (Attempt to Resuscitate)  Medical Interventions (Patient has pulse or is breathing): Full Support  Release to patient: Routine Release

## 2023-01-04 NOTE — H&P
DM type 1. bpp 6/8 with low juanito yesterday.    Needs delivery- s/p cervidil overnight but completely intolerant of cervical exams. Had exams in office with dr steele but unable to tolerate any exams on LD. Not sure she was aware of the cervical exams with a vaginal delivery.    D/w in detail options. Can place epidural so can check cervix and arom etc. Have not yet been able to start pitocin because unable to do exam.     Also offered primary csection.     D/w pt and fob extensively. Pros/ cons. Risks/ benefits of csection vs continued induction.     She wants primary csection. Last ate earlier this am.     Scheduled for 330 pm

## 2023-01-04 NOTE — NURSING NOTE
Patient requested to try SVE again.  More education given.  Unsuccessful attempt.  Patient unable to relax and tolerate.  Physician notified, and instructed me to wait and she will come in to talk to patient.

## 2023-01-04 NOTE — NURSING NOTE
"Cervidil removed at this time.  Patient very anxious, refused to let this nurse insert her fingers vaginally.  Education and discussion about relaxation and best positions to make process more comfortable.  This nurse put one finger between labia's and patient yelled for me to \"stop\", Patient then refused to have spontaneous vaginal exam performed.  "

## 2023-01-04 NOTE — OP NOTE
I was scrubbed and actively participating as first assistant, including providing exposure, delivery assistance, hemostatic maneuvers and closure for Dr. Dukes.

## 2023-01-05 LAB
BASOPHILS # BLD AUTO: 0.07 10*3/MM3 (ref 0–0.2)
BASOPHILS NFR BLD AUTO: 0.5 % (ref 0–1.5)
DEPRECATED RDW RBC AUTO: 43.1 FL (ref 37–54)
EOSINOPHIL # BLD AUTO: 0.06 10*3/MM3 (ref 0–0.4)
EOSINOPHIL NFR BLD AUTO: 0.5 % (ref 0.3–6.2)
ERYTHROCYTE [DISTWIDTH] IN BLOOD BY AUTOMATED COUNT: 13.2 % (ref 12.3–15.4)
FETAL BLEED: NEGATIVE
HCT VFR BLD AUTO: 29.7 % (ref 34–46.6)
HGB BLD-MCNC: 9.8 G/DL (ref 12–15.9)
IMM GRANULOCYTES # BLD AUTO: 0.11 10*3/MM3 (ref 0–0.05)
IMM GRANULOCYTES NFR BLD AUTO: 0.8 % (ref 0–0.5)
LYMPHOCYTES # BLD AUTO: 1.54 10*3/MM3 (ref 0.7–3.1)
LYMPHOCYTES NFR BLD AUTO: 11.8 % (ref 19.6–45.3)
MCH RBC QN AUTO: 29.9 PG (ref 26.6–33)
MCHC RBC AUTO-ENTMCNC: 33 G/DL (ref 31.5–35.7)
MCV RBC AUTO: 90.5 FL (ref 79–97)
MONOCYTES # BLD AUTO: 0.96 10*3/MM3 (ref 0.1–0.9)
MONOCYTES NFR BLD AUTO: 7.4 % (ref 5–12)
NEUTROPHILS NFR BLD AUTO: 10.28 10*3/MM3 (ref 1.7–7)
NEUTROPHILS NFR BLD AUTO: 79 % (ref 42.7–76)
NRBC BLD AUTO-RTO: 0 /100 WBC (ref 0–0.2)
NUMBER OF DOSES: NORMAL
PLATELET # BLD AUTO: 172 10*3/MM3 (ref 140–450)
PMV BLD AUTO: 11.5 FL (ref 6–12)
RBC # BLD AUTO: 3.28 10*6/MM3 (ref 3.77–5.28)
WBC NRBC COR # BLD: 13.02 10*3/MM3 (ref 3.4–10.8)

## 2023-01-05 PROCEDURE — 85025 COMPLETE CBC W/AUTO DIFF WBC: CPT | Performed by: OBSTETRICS & GYNECOLOGY

## 2023-01-05 PROCEDURE — 25010000002 RHO D IMMUNE GLOBULIN 1500 UNIT/2ML SOLUTION PREFILLED SYRINGE: Performed by: OBSTETRICS & GYNECOLOGY

## 2023-01-05 PROCEDURE — 25010000002 HEPARIN (PORCINE) PER 1000 UNITS: Performed by: OBSTETRICS & GYNECOLOGY

## 2023-01-05 PROCEDURE — 25010000002 KETOROLAC TROMETHAMINE PER 15 MG: Performed by: OBSTETRICS & GYNECOLOGY

## 2023-01-05 RX ADMIN — HUMAN RHO(D) IMMUNE GLOBULIN 1500 UNITS: 1500 SOLUTION INTRAMUSCULAR; INTRAVENOUS at 06:41

## 2023-01-05 RX ADMIN — ACETAMINOPHEN 1000 MG: 500 TABLET ORAL at 18:30

## 2023-01-05 RX ADMIN — ACETAMINOPHEN 1000 MG: 500 TABLET ORAL at 04:51

## 2023-01-05 RX ADMIN — DOCUSATE SODIUM 100 MG: 100 CAPSULE, LIQUID FILLED ORAL at 09:31

## 2023-01-05 RX ADMIN — DOCUSATE SODIUM 100 MG: 100 CAPSULE, LIQUID FILLED ORAL at 20:47

## 2023-01-05 RX ADMIN — ACETAMINOPHEN 1000 MG: 500 TABLET ORAL at 10:54

## 2023-01-05 RX ADMIN — KETOROLAC TROMETHAMINE 15 MG: 15 INJECTION, SOLUTION INTRAMUSCULAR; INTRAVENOUS at 00:59

## 2023-01-05 RX ADMIN — KETOROLAC TROMETHAMINE 15 MG: 15 INJECTION, SOLUTION INTRAMUSCULAR; INTRAVENOUS at 14:33

## 2023-01-05 RX ADMIN — KETOROLAC TROMETHAMINE 15 MG: 15 INJECTION, SOLUTION INTRAMUSCULAR; INTRAVENOUS at 20:47

## 2023-01-05 RX ADMIN — HEPARIN SODIUM 5000 UNITS: 5000 INJECTION INTRAVENOUS; SUBCUTANEOUS at 00:59

## 2023-01-05 RX ADMIN — KETOROLAC TROMETHAMINE 15 MG: 15 INJECTION, SOLUTION INTRAMUSCULAR; INTRAVENOUS at 06:45

## 2023-01-05 RX ADMIN — HEPARIN SODIUM 5000 UNITS: 5000 INJECTION INTRAVENOUS; SUBCUTANEOUS at 12:52

## 2023-01-05 NOTE — OP NOTE
[facility]   Section Operative Note    Patient Name: Pat Finley  :  1990  MRN:  [id number]      Date of procedure:  2023        Pre-Operative Dx:   1.  IUP at 37w4d weeks   2. Vaginismus- desires  section  3. Type 1 DM  4. Factor V leiden        Postoperative Dx:  Same        Procedure: , Low Transverse  primary   Surgeon: Saranya Dukes MD      Assistant: MD Sulma     Anesthesia: Spinal    EBL: QBL 403ml     Specimens: Art ph/ placenta         Findings:                           Amniotic Fluid clear  Placenta Intact, 3 VC  Uterus normal  Tubes and ovaries normal bilaterally              Infant:           Gender: Viable male  infant     Weight: 3360 g (7 lb 6.5 oz)     Apgars: 8  @ 1 minute /     9  @ 5 minutes                 Indication for C/Section:     Severe vaginismus intolerant of vaginal exams.   pt choice         Procedure Details:  The patient was taken to the operating room where spinal anesthesia was found to be adequate. She was prepped and draped in the usual sterile manner in the dorsal supine position with leftward tilt. A Pfannenstiel incision was made and carried down to the fascia. The fascia was incised in the mid-line and extended laterally  with Moon scissors. The fascia was grasped and elevated and  from the underlying rectus muscles sharply. The peritoneum was identified and entered. Peritoneal incision was extended superiorly and inferiorly with good visualization of the bladder. The bladder flap was created sharply. The bladder blade was reinserted. The  lower uterine segment was incised in a transverse fashion and extended laterally.   The head was elevated and delivered through the incision. The remainder of the infant was delivered without difficulty. The umbilical cord was clamped and cut after delayed cord clamping and the infant was handed off the field. Cord ph and cord bloods were obtained. The placenta was removed intact and  appeared normal. The uterine incision was inspected and found to be without extensions. Uterine incision was closed in 2 layers with O monocryl . Second layer closure was  imbricating the first incorporating bladder flap peritoneum. The pelvic side walls were irrigated and cleared of all clot and debris. Adnexal anatomy was normal. The uterine incision was inspected and noted to be hemostatic. Rectus muscles were reapproximated  with 0 vicryl incorporating peritoneum. Subfacial area irrigated and made hemostatic.   The fascia was closed with 0 PDS in running continuous fashion. The subcutaneous tissue was irrigated and made hemostatic with bovie and closed with 3-0 vicryl. The skin was closed in subcuticular fashion with 4-0 Monocryl.   Instrument, sponge, and needle counts were correct prior the abdominal closure and at the conclusion of the case. Mother  to recovery room in stable condition.     Local infiltration to skin, subq, fascia, muscle         Complications:     None          Antibiotics: cefazolin (Ancef)                 Pregnancy    Type 1 diabetes mellitus affecting pregnancy in second trimester, antepartum    Factor V Leiden mutation affecting pregnancy (HCC)    In vitro fertilization         Saranya Dukes MD  1/4/2023  20:12 EST

## 2023-01-05 NOTE — PLAN OF CARE
Problem: Adult Inpatient Plan of Care  Goal: Plan of Care Review  Outcome: Unable to Meet, Plan Revised  Goal: Absence of Hospital-Acquired Illness or Injury  Outcome: Unable to Meet, Plan Revised  Goal: Optimal Comfort and Wellbeing  Outcome: Unable to Meet, Plan Revised  Goal: Readiness for Transition of Care  Outcome: Unable to Meet, Plan Revised     Problem: Bleeding (Labor)  Goal: Hemostasis  Outcome: Unable to Meet, Plan Revised     Problem: Change in Fetal Wellbeing (Labor)  Goal: Stable Fetal Wellbeing  Outcome: Unable to Meet, Plan Revised     Problem: Delayed Labor Progression (Labor)  Goal: Effective Progression to Delivery  Outcome: Unable to Meet, Plan Revised     Problem: Infection (Labor)  Goal: Absence of Infection Signs and Symptoms  Outcome: Unable to Meet, Plan Revised     Problem: Labor Pain (Labor)  Goal: Acceptable Pain Control  Outcome: Unable to Meet, Plan Revised     Problem: Uterine Tachysystole (Labor)  Goal: Normal Uterine Contraction Pattern  Outcome: Unable to Meet, Plan Revised   Goal Outcome Evaluation:                 Problem: Adult Inpatient Plan of Care  Goal: Plan of Care Review  Outcome: Unable to Meet, Plan Revised  Goal: Absence of Hospital-Acquired Illness or Injury  Outcome: Unable to Meet, Plan Revised  Goal: Optimal Comfort and Wellbeing  Outcome: Unable to Meet, Plan Revised  Goal: Readiness for Transition of Care  Outcome: Unable to Meet, Plan Revised     Problem: Bleeding (Labor)  Goal: Hemostasis  Outcome: Unable to Meet, Plan Revised     Problem: Change in Fetal Wellbeing (Labor)  Goal: Stable Fetal Wellbeing  Outcome: Unable to Meet, Plan Revised     Problem: Delayed Labor Progression (Labor)  Goal: Effective Progression to Delivery  Outcome: Unable to Meet, Plan Revised     Problem: Infection (Labor)  Goal: Absence of Infection Signs and Symptoms  Outcome: Unable to Meet, Plan Revised     Problem: Labor Pain (Labor)  Goal: Acceptable Pain Control  Outcome: Unable  to Meet, Plan Revised     Problem: Uterine Tachysystole (Labor)  Goal: Normal Uterine Contraction Pattern  Outcome: Unable to Meet, Plan Revised

## 2023-01-05 NOTE — LACTATION NOTE
This note was copied from a baby's chart.  P1 37w4d. Mom reports latching difficulties, using NS, supplementing with formula and pumping with hand pump, no milk obtained yet. Encouraged to call for assistance with breast feeding,  pumping every 3hrs giving all EBM if having difficulties then supplementing with formula. Reviewed hand pump operation, encouraged pumping [id number] bilaterally. Discussed ways to know baby is getting enough milk, feeding cues and baby's expected output.

## 2023-01-05 NOTE — ANESTHESIA POSTPROCEDURE EVALUATION
Patient: Pat Finley    Procedure Summary     Date: 23 Room / Location:  [facility] /  [facility]    Anesthesia Start:  Anesthesia Stop:     Procedure:  SECTION PRIMARY (Abdomen) Diagnosis:     Surgeons: Saranya Dukes MD Provider: Melissa Leahy MD    Anesthesia Type: spinal ASA Status: 2          Anesthesia Type: spinal    Vitals  Vitals Value Taken Time   /62 23   Temp 36.7 °C (98.1 °F) 23 183   Pulse 63 23   Resp 16 23 1900   SpO2 99 % 23   Vitals shown include unvalidated device data.        Post Anesthesia Care and Evaluation    Patient location during evaluation: bedside  Patient participation: complete - patient participated  Level of consciousness: awake  Pain management: adequate    Airway patency: patent  Anesthetic complications: No anesthetic complications  PONV Status: controlled  Cardiovascular status: acceptable  Respiratory status: acceptable  Hydration status: acceptable    Comments: --------------------            23     --------------------   BP:                  Pulse:      66       Resp:                Temp:                SpO2:      99%      --------------------

## 2023-01-05 NOTE — PROGRESS NOTES
[facility]   PROGRESS NOTE    Patient Name: Pat Finley  :  1990  MRN:  [id number]      Post-Op Day 1 S/P    Delivered a male infant.  Subjective     Patient reports:    Pain is well controlled. Voiding and ambulating without difficulty.    Tolerating po. Lochia normal.       The patient plans to breastfeed.         Objective       Vitals: Vital Signs Range for the last 24 hours  Temperature: Temp:  [97 °F (36.1 °C)-98.4 °F (36.9 °C)] 98 °F (36.7 °C)   Temp Source: Temp src: Oral   BP: BP: ()/(62-82) 117/77   Pulse: Heart Rate:  [] 76   Respirations: Resp:  [16-21] 21         Intake/Output Summary (Last 24 hours) at 2023 1005  Last data filed at 2023 0645  Gross per 24 hour   Intake 1104.34 ml   Output 1178 ml   Net -73.66 ml                                              Physical Exam     General Alert and awake, in NAD      CV Regular rate and rhythm      Lungs clear to auscultation bilaterally        Abdomen Soft, non-distended, fundus firm,  1 below umbilicus, appropriately tender      Incision  Dressing clean, dry and intact.      Extremities  tr edema, calves NT               LABS: Results from last 7 days   Lab Units 23  0754 23  1531   WBC 10*3/mm3 13.02* 12.23*   HEMOGLOBIN g/dL 9.8* 11.9*   HEMATOCRIT % 29.7* 35.1   PLATELETS 10*3/mm3 172 234         Prenatal labs results reviewed:  Yes   Rubella:  Immune  Rh Status:    RH type   Date Value Ref Range Status   2023 Negative  Final     Comment:     RhIG IS Indicated. Baby is Rh Positive                       Assessment & Plan   : 1. POD 1 S/P C/S: Hemodynamically stable.  Doing well.  Continue routine care.     Type I diabetes w/ insulin pump    Factor V w/ hx PE-- on lovenox    Desires circ for baby boy-- d/w       Pregnancy    Type 1 diabetes mellitus affecting pregnancy in second trimester, antepartum    Factor V Leiden mutation affecting pregnancy (HCC)    In vitro  fertilization          Sabiha Castillo, APRN  1/5/2023  10:05 EST

## 2023-01-05 NOTE — LACTATION NOTE
Lactation Consult Note  P1, 37.4 weeks baby- new admission. IVF pregnancy and mom has Hx of type I diabetes. Rn asked LC to help mom with BF because baby's BGM is 46. Assisted mother in attempting to latch baby  in a football position to the left breast. Educated her starting nose to nipple to obtain deep latch ,but baby was not able to achieve it. PT’s nipples are short and retracting and the areolas are dense. After working with mom and baby for 15 min. without success NS(24mm) given with instructions of placement and use. Finally baby started BF. He is latching well, has nutritive suckle, and has a good jaw rotation, but is falling asleep easily. Discussed ways to keep baby awake during BF. Encouraged mom to try to BF every 2-3 hours for 15 min. on each side.Educated on importance of deep latching, hand expression, different ways to rouse infant and burping him. Also hand pump given to mom with instructions of use and cleaning the parts. She will pump the right breast after finishing BF baby on the left. She declines any questions and concerns at this time. PT already ordered PBP.Encouraged to call LC if needing further assistance.          Evaluation Completed: 2023 21:34 EST  Patient Name: Pat Finley  :  1990  MRN:  [id number]     REFERRAL  INFORMATION:                          Date of Referral: 23   Person Making Referral: nurse  Maternal Reason for Referral: breastfeeding currently  Infant Reason for Referral: 35-37 weeks gestation, sleepy    DELIVERY HISTORY:        Skin to skin initiation date/time:      Skin to skin end date/time:           MATERNAL ASSESSMENT:  Breast Size Issue: none (23)  Breast Shape: Bilateral:, round (23)  Breast Density: Bilateral:, soft (23)  Areola: Bilateral:, dense (23)  Nipples: Bilateral:, short, retracting (23)                INFANT ASSESSMENT:  Information for the patient's :  Gabi Finley  [[id number]]   No past medical history on file.     Feeding Readiness Cues: sleeping (23)                     Feeding Interventions: arousal required, jaw supported, latch assistance provided, lips stroked, sucking promoted (23)               Breastfeeding: breastfeeding, left side only (23)   Infant Positioning: clutch/football (23)         Effective Latch During Feeding: yes (23)   Suck/Swallow/Breathing Coordination: present (with NS) (23)   Signs of Milk Transfer: deep jaw excursions noted (23)       Latch: 1-->repeated attempts, holds nipple in mouth, stimulate to suck (23)   Audible Swallowin-->a few with stimulation (23)   Type of Nipple: 1-->flat (23)   Comfort (Breast/Nipple): 2-->soft/nontender (23)   Hold (Positioning): 1-->minimal assist, teach one side, mother does other, staff holds (23)   Latch Score: 6 (23)                    MATERNAL INFANT FEEDING:     Maternal Emotional State: relaxed, receptive (23)  Infant Positioning: clutch/football (23)   Signs of Milk Transfer: deep jaw excursions noted (23)  Pain with Feeding: no (23)           Milk Ejection Reflex: present (23)           Latch Assistance: minimal assistance (23)                               EQUIPMENT TYPE:                                 BREAST PUMPING:          LACTATION REFERRALS:

## 2023-01-06 LAB
ATMOSPHERIC PRESS: 745.1 MMHG
BASE EXCESS BLDCOA CALC-SCNC: 0.2 MMOL/L (ref -2–2)
BDY SITE: ABNORMAL
HCO3 BLDCOA-SCNC: 28 MMOL/L (ref 22–28)
INHALED O2 CONCENTRATION: 21 %
MODALITY: ABNORMAL
NOTE: ABNORMAL
PCO2 BLDCOA: 55.8 MMHG (ref 43–63)
PH BLDCOA: 7.31 PH UNITS (ref 7.18–7.34)
PO2 BLDCOA: 16.4 MMHG (ref 12–26)
SAO2 % BLDCOA: 18.7 % (ref 92–99)

## 2023-01-06 PROCEDURE — 25010000002 HEPARIN (PORCINE) PER 1000 UNITS: Performed by: OBSTETRICS & GYNECOLOGY

## 2023-01-06 RX ORDER — ENOXAPARIN SODIUM 100 MG/ML
40 INJECTION SUBCUTANEOUS EVERY 24 HOURS
Status: DISCONTINUED | OUTPATIENT
Start: 2023-01-06 | End: 2023-01-08 | Stop reason: HOSPADM

## 2023-01-06 RX ADMIN — ACETAMINOPHEN 650 MG: 325 TABLET, FILM COATED ORAL at 08:07

## 2023-01-06 RX ADMIN — DOCUSATE SODIUM 100 MG: 100 CAPSULE, LIQUID FILLED ORAL at 20:57

## 2023-01-06 RX ADMIN — IBUPROFEN 600 MG: 600 TABLET, FILM COATED ORAL at 11:10

## 2023-01-06 RX ADMIN — ACETAMINOPHEN 650 MG: 325 TABLET, FILM COATED ORAL at 00:47

## 2023-01-06 RX ADMIN — HEPARIN SODIUM 5000 UNITS: 5000 INJECTION INTRAVENOUS; SUBCUTANEOUS at 00:47

## 2023-01-06 RX ADMIN — ACETAMINOPHEN 650 MG: 325 TABLET, FILM COATED ORAL at 14:32

## 2023-01-06 RX ADMIN — IBUPROFEN 600 MG: 600 TABLET, FILM COATED ORAL at 04:50

## 2023-01-06 RX ADMIN — IBUPROFEN 600 MG: 600 TABLET, FILM COATED ORAL at 16:53

## 2023-01-06 RX ADMIN — DOCUSATE SODIUM 100 MG: 100 CAPSULE, LIQUID FILLED ORAL at 08:07

## 2023-01-06 RX ADMIN — ACETAMINOPHEN 650 MG: 325 TABLET, FILM COATED ORAL at 20:57

## 2023-01-06 RX ADMIN — HEPARIN SODIUM 5000 UNITS: 5000 INJECTION INTRAVENOUS; SUBCUTANEOUS at 11:10

## 2023-01-06 RX ADMIN — IBUPROFEN 600 MG: 600 TABLET, FILM COATED ORAL at 23:33

## 2023-01-06 NOTE — PLAN OF CARE
Goal Outcome Evaluation:            VSS, pain well controlled, ambulating well, pumping and formula feeding well, shower this AM, ambulation encouraged

## 2023-01-06 NOTE — PROGRESS NOTES
[facility]   PROGRESS NOTE    Patient Name: Pat Finley  :  1990  MRN:  [id number]      Post-Op 2 S/P   Subjective     Patient reports:   Doing well. Pain well controlled. Tolerating regular diet and having flatus.    Lochia normal.       Objective       Vitals: Vital Signs Range for the last 24 hours  Temperature: Temp:  [98 °F (36.7 °C)-99.2 °F (37.3 °C)] 99.2 °F (37.3 °C)       BP: BP: ()/(63-78) 124/78   Pulse: Heart Rate:  [67-88] 67   Respirations: Resp:  [16-22] 16                                         Physical Exam     General Alert       Abdomen Soft, non-distended, fundus firm, 1 below umbilicus, appropriately tender      Incision  Intact, no erythema or exudate      Extremities Calves NT bilaterally                Assessment & Plan  :   POD 2  -  Doing well.  Continue usual cares.    Type I DM-- bs have been a little high but has used some correction doses.  Feels well   Factor V- on lovenox   Dizziness in shower this am. Reviewed recs.  Feels better now that she is back in bed.          Pregnancy    Type 1 diabetes mellitus affecting pregnancy in second trimester, antepartum    Factor V Leiden mutation affecting pregnancy (HCC)    In vitro fertilization               Sabiha Castillo, ABIOLA  2023  09:42 EST

## 2023-01-06 NOTE — LACTATION NOTE
This note was copied from a baby's chart.  Rounded on PT at this time, she said baby is still getting formula, and she is pumping randomly with the hand pump, but not getting any colostrum. HGP initiated at this time with instructions of use, cleaning the parts and milk storage. Encouraged PT to pump every 3h for 15 min. on each breast if baby is not BF. Educated on the importance of stimulation for adequate milk supply. Discussed colostrum expectations and when to expect mature milk supply. Encouraged to call if needing further help.

## 2023-01-07 PROCEDURE — 25010000002 ENOXAPARIN PER 10 MG: Performed by: OBSTETRICS & GYNECOLOGY

## 2023-01-07 RX ADMIN — ENOXAPARIN SODIUM 40 MG: 100 INJECTION SUBCUTANEOUS at 17:52

## 2023-01-07 RX ADMIN — ACETAMINOPHEN 650 MG: 325 TABLET, FILM COATED ORAL at 22:32

## 2023-01-07 RX ADMIN — IBUPROFEN 600 MG: 600 TABLET, FILM COATED ORAL at 05:17

## 2023-01-07 RX ADMIN — ACETAMINOPHEN 650 MG: 325 TABLET, FILM COATED ORAL at 01:59

## 2023-01-07 RX ADMIN — DOCUSATE SODIUM 100 MG: 100 CAPSULE, LIQUID FILLED ORAL at 08:11

## 2023-01-07 RX ADMIN — DOCUSATE SODIUM 100 MG: 100 CAPSULE, LIQUID FILLED ORAL at 20:43

## 2023-01-07 RX ADMIN — ACETAMINOPHEN 650 MG: 325 TABLET, FILM COATED ORAL at 15:32

## 2023-01-07 RX ADMIN — ACETAMINOPHEN 650 MG: 325 TABLET, FILM COATED ORAL at 08:11

## 2023-01-07 RX ADMIN — IBUPROFEN 600 MG: 600 TABLET, FILM COATED ORAL at 17:52

## 2023-01-07 NOTE — PROGRESS NOTES
[facility]   PROGRESS NOTE    Patient Name: Pat Finley  :  1990  MRN:  [id number]      Post-Op 3 S/P   Subjective     Patient reports:   Doing well. Pain well controlled. Tolerating regular diet and having flatus. Lochia normal.   Reports BS are normalizing.       Objective       Vitals: Vital Signs Range for the last 24 hours  Temperature: Temp:  [97.6 °F (36.4 °C)-98.2 °F (36.8 °C)] 97.6 °F (36.4 °C)       BP: BP: (106-121)/(68-78) 106/68   Pulse: Heart Rate:  [66-94] 66   Respirations: Resp:  [16-18] 18                                         Physical Exam     General Alert       Abdomen Soft, non-distended, fundus firm, 2 FB below umbilicus, appropriately tender      Incision  Intact, no erythema or exudate      Extremities Calves NT bilaterally                Assessment & Plan  :   POD 3  -  Doing well.  Continue usual cares. Home tomorrow.          Pregnancy    Type 1 diabetes mellitus affecting pregnancy in second trimester, antepartum    Factor V Leiden mutation affecting pregnancy (HCC)    In vitro fertilization               Viktor Lopez MD  2023  12:19 EST

## 2023-01-08 VITALS
SYSTOLIC BLOOD PRESSURE: 121 MMHG | HEART RATE: 80 BPM | RESPIRATION RATE: 16 BRPM | DIASTOLIC BLOOD PRESSURE: 82 MMHG | OXYGEN SATURATION: 98 % | WEIGHT: 211 LBS | TEMPERATURE: 97.9 F | BODY MASS INDEX: 33.91 KG/M2 | HEIGHT: 66 IN

## 2023-01-08 PROBLEM — Z34.90 PREGNANCY: Status: RESOLVED | Noted: 2022-08-03 | Resolved: 2023-01-08

## 2023-01-08 RX ORDER — IBUPROFEN 600 MG/1
600 TABLET ORAL EVERY 6 HOURS
Qty: 50 TABLET | Refills: 3 | Status: SHIPPED | OUTPATIENT
Start: 2023-01-08

## 2023-01-08 RX ORDER — OXYCODONE HYDROCHLORIDE 5 MG/1
5 TABLET ORAL EVERY 4 HOURS PRN
Qty: 30 TABLET | Refills: 0 | Status: SHIPPED | OUTPATIENT
Start: 2023-01-08 | End: 2023-01-11

## 2023-01-08 RX ORDER — ENOXAPARIN SODIUM 100 MG/ML
40 INJECTION SUBCUTANEOUS EVERY 24 HOURS
Qty: 30 ML | Refills: 1 | Status: SHIPPED | OUTPATIENT
Start: 2023-01-08

## 2023-01-08 RX ORDER — ACETAMINOPHEN 325 MG/1
650 TABLET ORAL EVERY 6 HOURS
Qty: 40 TABLET | Refills: 1 | Status: SHIPPED | OUTPATIENT
Start: 2023-01-08

## 2023-01-08 RX ADMIN — ACETAMINOPHEN 650 MG: 325 TABLET, FILM COATED ORAL at 04:30

## 2023-01-08 RX ADMIN — IBUPROFEN 600 MG: 600 TABLET, FILM COATED ORAL at 00:35

## 2023-01-08 RX ADMIN — IBUPROFEN 600 MG: 600 TABLET, FILM COATED ORAL at 09:37

## 2023-01-08 RX ADMIN — DOCUSATE SODIUM 100 MG: 100 CAPSULE, LIQUID FILLED ORAL at 09:38

## 2023-01-08 RX ADMIN — ACETAMINOPHEN 650 MG: 325 TABLET, FILM COATED ORAL at 10:46

## 2023-01-08 NOTE — DISCHARGE SUMMARY
Date of Discharge:  2023    Discharge Diagnosis: Term pregnancy, delivered    Presenting Problem/History of Present Illness  Pregnancy [Z34.90]       Hospital Course  Patient is a 32 y.o. female presented with G1 admitted initially for IOL due to BPP  and pregnancy complicated by DM, IVF and Factor V Leiden.  After ripening decided to proceed with primary section and was delivered.  She has recovered well and ready for DC home.  Procedures Performed  Procedure(s):   SECTION PRIMARY         Condition on Discharge:  Post-Op Day 4S/P   Subjective     Patient reports:    Doing well - ready for discharge. Pain well controlled. Tolerating po and having flatus. Voiding and ambulating without difficulty. Lochia normal.     Vital Signs  Temp:  [97.9 °F (36.6 °C)-98 °F (36.7 °C)] 97.9 °F (36.6 °C)  Heart Rate:  [63-94] 80  Resp:  [16-18] 16  BP: (112-121)/(72-82) 121/82    Physical Exam    Gen Alert and awake   Abdomen Soft, ND,  Fundus firm with minimal tenderness   Incision  Intact, without erythema or exudate   Extremities Calves NT bilaterally     Assessment & Plan ]   Assessment:  POD 4 -  Doing well. Stable for discharge.     Plan:    Instructions reviewed       Consults:   Consults     No orders found from 2022 to 2023.          Discharge Disposition  Home or Self Care    Discharge Medications     Discharge Medications      New Medications      Instructions Start Date   acetaminophen 325 MG tablet  Commonly known as: TYLENOL   650 mg, Oral, Every 6 Hours      ibuprofen 600 MG tablet  Commonly known as: ADVIL,MOTRIN   600 mg, Oral, Every 6 Hours      oxyCODONE 5 MG immediate release tablet  Commonly known as: ROXICODONE   5 mg, Oral, Every 4 Hours PRN         Changes to Medications      Instructions Start Date   Enoxaparin Sodium 40 MG/0.4ML solution prefilled syringe syringe  Commonly known as: LOVENOX  What changed:   · medication strength  · See the new instructions.   40 mg,  Subcutaneous, Every 24 Hours         Continue These Medications      Instructions Start Date   calcium carbonate 500 MG chewable tablet  Commonly known as: TUMS   2 tablets, Oral, 2 Times Daily      CLARITIN PO   Oral      Dexcom G6 Sensor   APPLY 1 SENSOR EVERY 10 DAYS      Dexcom G6 Transmitter misc   See Admin Instructions      famotidine 20 MG tablet  Commonly known as: Pepcid   20 mg, Oral, Daily      FIBER PO   2 capsules, Oral      Insulin Glargine 100 UNIT/ML injection pen  Commonly known as: LANTUS SOLOSTAR   20 Units, Subcutaneous, Back up as needed for pump failure      insulin lispro 100 UNIT/ML injection  Commonly known as: humaLOG   Subcutaneous, 3 Times Daily Before Meals      Insulin Lispro 100 UNIT/ML injection  Commonly known as: humaLOG   USE 50 UNITS IN PUMP AS DIRECTED      PRENATAL VITAMIN PO   Oral         Stop These Medications    aspirin 81 MG EC tablet     heparin (porcine) 5000 UNIT/ML injection            The patient has been prescribed a controlled substance.  She has been counseled on the risks associated with using the medication.   The addictive potential of this medication and alternatives were discussed carefully with this patient and she demonstrated understanding.  A SURYA report has been obtained and reviewed.    Activity at Discharge: restrictions reviewed    Follow-up Appointments  2 and 6 weeks      Test Results Pending at Discharge  none     Ailyn Mandel MD  01/08/23  09:00 EST

## 2023-01-08 NOTE — LACTATION NOTE
follow-up. Patient is preparing for discharge . She has questions about when milk should come in as her breasts are still soft and she is getting very little milk when she pumps. She states she has been consistent with pumping q 3 hours around the clock and staying well hydrated. Discussed flange sizes and lubricating flange when pumping for greater ease and comfort.  contact numbers provided .   Lactation Consult Note    Evaluation Completed: 2023 11:20 EST  Patient Name: Pat Finley  :  1990  MRN:  [id number]     REFERRAL  INFORMATION:                          Date of Referral: 23   Person Making Referral: lactation consultant  Maternal Reason for Referral: breastfeeding currently, no prior breastfeeding experience, other (see comments) (exclusive pumping)  Infant Reason for Referral: 35-37 weeks gestation    DELIVERY HISTORY:        Skin to skin initiation date/time:      Skin to skin end date/time:           MATERNAL ASSESSMENT:  Breast Size Issue: none (23 1100)  Breast Shape: round, pendulous (23 1100)  Breast Density: soft (23 1100)                      INFANT ASSESSMENT:  Information for the patient's :  Gabi Finley [[id number]]   No past medical history on file.                                                                                                     MATERNAL INFANT FEEDING:     Maternal Emotional State: relaxed, receptive (23 1100)                                                                 EQUIPMENT TYPE:                                 BREAST PUMPING:          LACTATION REFERRALS:

## 2023-01-13 ENCOUNTER — HOSPITAL ENCOUNTER (OUTPATIENT)
Dept: LACTATION | Facility: HOSPITAL | Age: 33
Discharge: HOME | End: 2023-01-13
Payer: COMMERCIAL

## 2023-01-13 NOTE — LACTATION NOTE
Pat is here with baby Micheal. He did not latch much in hospital . At the pediatrician on Thursday Micheal had not gained any weight and was at his discharge weight. As a result , Parents asked if I could write down the baby's weight today but not tell them what it was. His birthweight was 7lbs 7 oz and today in a dry diaper was 6 lbs 10.9 oz. Mom said she pumps q 3 hours with her medela pump and gets maybe 15-18cc and it is always painful.  Her pediatrician said to only try to latch baby  2 x per day because it is too stressful for Pat. She did bring her pump but left crucial pieces at home.  was able to find a HGP and set up for her to pump and compare to what she uses at home. We lubricated the 24 mm flange and it was a good fit with areola staying at opening of tunnel and nipples moving freely back and forth. She obtained 42 cc and baby transferred 24 cc from nursing with a 24 mm NS. Her nipples bobby quickly and the uncomfortable most of the time. Her breasts were full when she arrived and we did attempt to latch baby to the left but he had a narrow gape and could not get the nipple fully into his mouth. He did have a nutritive suckle on the shield and after the first few sucks mom denied discomfort. He nursed 40 minutes going back and forth between breasts with deep jaw excursions and shield full of milk on release .Nipples were rounded on release and only lightly blanched . Parents will return on Monday at 0945 for follow up.   Lactation Consult Note    Evaluation Completed: 2023 18:12 EST  Patient Name: Pat Finley  :  1990  MRN:  [id number]     REFERRAL  INFORMATION:                          Date of Referral: 23   Person Making Referral: patient  Maternal Reason for Referral: breastfeeding currently, no prior breastfeeding experience (type 1 diabetic)  Infant Reason for Referral: 35-37 weeks gestation, other (see comments) (mom is Type one diabetic)    DELIVERY HISTORY:         Skin to skin initiation date/time:      Skin to skin end date/time:           MATERNAL ASSESSMENT:     Breast Shape: pendulous (23)  Breast Density: full (23)  Areola: dense (23)  Nipples: flat, graspable (23)  Nipple Width: Left:, 1.5 cm, 2.0 cm (23)  Left Nipple Symptoms: intact (23)  Right Nipple Symptoms: intact, other (see comments) (nipple blanced within a few seconds of sucking) (23)       INFANT ASSESSMENT:  Information for the patient's :  Gabi Finley [[id number]]   No past medical history on file.                                                                                                     MATERNAL INFANT FEEDING:     Maternal Emotional State: anxious, difficulty focusing (23)      Signs of Milk Transfer: deep jaw excursions noted, suck/swallow ratio, transfer present, other (see comments) (milk in 24 mm nipple s hield) (23)  Pain with Feeding: yes (23)  Pain Location: breast, left (23)  Pain Description: dull (23)     Milk Ejection Reflex: present (23)           Latch Assistance: full assistance needed (23)                               EQUIPMENT TYPE:  Breast Pump Type: double electric, personal (23)  Breast Pump Flange Type: hard (23)  Breast Pump Flange Size: 24 mm, 27 mm (23)                        BREAST PUMPING:  Breast Pumping Interventions: other (see comments) (q 3 hbours) (23)  Breast Pumping: bilateral breasts pumped until soft, double electric breast pump utilized, pre-pumping breast massage, pre-pumping hand expression (23)    LACTATION REFERRALS:

## 2023-01-16 ENCOUNTER — HOSPITAL ENCOUNTER (OUTPATIENT)
Dept: LACTATION | Facility: HOSPITAL | Age: 33
Discharge: HOME | End: 2023-01-16

## 2023-01-16 NOTE — LACTATION NOTE
Mom & infant present to [facility] for concerns with infant weight gain, trouble latching over weekend, & pump not emptying breasts.    Infant  & today’s age: 23; 12 days old    Birth wt: 3360g/7-6.5oz    Pre-feeding wt: 3814g/7-0.3    Post-feeding wt: Not obtained, infant latched for 2-3 mins then became sleepy.  Mom states she prefers to pump & bottle feed her milk.  She says she usually pumps 45-50 ml in 20 mins & she has been pumping q3hrs x20 mins. She pumped with her Medela PBP & 24mm flanges x20 mins.  Dad bottle fed 2oz of mom's EBM.    Recommendations/Education:     Gentle breast massage & hand expression prior to pumping, may use heat with breast massage; massage while double pumping for 15 mins to not more than 20 mins at a time, review pump manual for instruction on using maintenance setting; lubricate pump flanges as needed; increase pump suction setting to max tolerable level; ice to breasts x15-20 mins after pumping, lie flat if possible while applying ice; motrin as prescribed upon d/c from hospital until engorgement resolved; continue pumping q2.5-3 hrs; continue attempting latching baby as desired with nipple shield.  Your Guide to PP & [place] care book given.  Encouraged to review Part 3 BF'g section.  Questions answered regarding Benefits of BF'g overall for mom & baby & compared direct BF'g to exclusive pumping.  Encouragement given to think about BF'g goals & that any amount of breast milk given to baby is beneficial.    Follow up with Pediatrician as scheduled 23 & OB phone call next week then in person office visit at 6 weeks PP.  Follow up with [facility] as needed.       Lactation Consult Note    Evaluation Completed: 2023 11:18 EST  Patient Name: Pat Finley  :  1990  MRN:  [id number]     REFERRAL  INFORMATION:                          Date of Referral: 23   Person Making Referral: lactation consultant  Maternal Reason for Referral: latch difficulty  Infant Reason for  Referral: weight gain inadequate, other (see comments) (nipple shield use)      MATERNAL ASSESSMENT:     Breast Shape: Bilateral:, pendulous, round (23)  Breast Density: Bilateral:, engorged (23)  Areola: Bilateral:, elastic (23)  Nipples: Bilateral:, graspable (23)           Breast Signs/Symptoms of Infection: redness, warmth, increased (23)    MATERNAL INFANT FEEDING:     Maternal Emotional State: anxious, tense (23)  Infant Positioning: cross-cradle (23)   Signs of Milk Transfer: none noted (23)  Pain with Feeding: no (23)           Milk Ejection Reflex: present (23)           Latch Assistance: verbal guidance offered, minimal assistance (23)                           Feeding Readiness Cues: finger sucking, hand to mouth movements, rooting (23)        Effective Latch During Feeding: no (23)  Suck/Swallow/Breathing Coordination: other (see comments) (23)     Prefeeding Weight (gm): 3184 g (112.3 oz) (23)            Latch: 1-->repeated attempts, holds nipple in mouth, stimulate to suck (23)  Audible Swallowin-->none (23)  Type of Nipple: 2-->everted (after stimulation) (23)  Comfort (Breast/Nipple): 0-->engorged, cracked, bleeding, large blisters or bruises (23)  Hold (Positioning): 1-->minimal assist, teach one side, mother does other, staff holds (23)  Latch Score: 4 (23)      EQUIPMENT TYPE:  Breast Pump Type: double electric, personal (23)  Breast Pump Flange Type: hard (23)  Breast Pump Flange Size: 24 mm (23)                        BREAST PUMPING:  Breast Pumping Interventions: frequent pumping encouraged (q2.5-3 hrs) (23 6489)       LACTATION REFERRALS:

## 2023-08-08 NOTE — PROGRESS NOTES
MATERNAL FETAL MEDICINE Preconception Note    Dear Dr Melissa Leonard MD:    Thank you for your kind referral of Pat Finley.  As you know, she is a 33 y.o.   here for preconception visit        Her antepartum course is complicated by:  Type 1 DM  Factor V Leiden Heterozygous with FH PE  DM retinopathy currently in treatment          HPI: Today, she denies headache, blurry vision, RUQ pain.     Review of History:  Past Medical History:   Diagnosis Date    Diabetes type I     [facility]- Dr. Win and Anita MA    Heterozygous factor V Leiden mutation     Matthews product of IVF pregnancy      Past Surgical History:   Procedure Laterality Date     SECTION N/A 2023    Procedure:  SECTION PRIMARY;  Surgeon: Saranya Dukes MD;  Location: [facility] LABOR DELIVERY;  Service: Obstetrics/Gynecology;  Laterality: N/A;    EYE SURGERY      Laser eye treatment-diabetic retinopathy       Social History     Socioeconomic History    Marital status:    Tobacco Use    Smoking status: Never    Smokeless tobacco: Never   Vaping Use    Vaping Use: Never used   Substance and Sexual Activity    Alcohol use: Not Currently    Drug use: Never    Sexual activity: Yes     Partners: Male     Family History   Problem Relation Age of Onset    Factor V Leiden deficiency Father     Pulmonary embolism Father     Diabetes Other         Maternal      Allergies   Allergen Reactions    Adhesive Tape Rash     Very sensitive      Current Outpatient Medications on File Prior to Visit   Medication Sig Dispense Refill    acetaminophen (TYLENOL) 325 MG tablet Take 2 tablets by mouth Every 6 (Six) Hours. 40 tablet 1    calcium carbonate (TUMS) 500 MG chewable tablet Chew 2 tablets 2 (Two) Times a Day.      Continuous Blood Gluc Sensor (Dexcom G6 Sensor) APPLY 1 SENSOR EVERY 10 DAYS      Continuous Blood Gluc Transmit (Dexcom G6 Transmitter) misc See Admin Instructions.       "Enoxaparin Sodium (LOVENOX) 40 MG/0.4ML solution prefilled syringe syringe Inject 0.4 mL under the skin into the appropriate area as directed Daily. Indications: Prevention of Unwanted Clot in Veins 30 mL 1    famotidine (Pepcid) 20 MG tablet Take 1 tablet by mouth Daily. 30 tablet 5    FIBER PO Take 2 capsules by mouth.      ibuprofen (ADVIL,MOTRIN) 600 MG tablet Take 1 tablet by mouth Every 6 (Six) Hours. 50 tablet 3    Insulin Glargine (LANTUS SOLOSTAR) 100 UNIT/ML injection pen Inject 20 Units under the skin into the appropriate area as directed. Back up as needed for pump failure      insulin lispro (humaLOG) 100 UNIT/ML injection Inject  under the skin 3 (Three) Times a Day Before Meals.      Insulin Lispro (humaLOG) 100 UNIT/ML injection USE 50 UNITS IN PUMP AS DIRECTED      Loratadine (CLARITIN PO) Take  by mouth.      Prenatal Vit-Fe Fumarate-FA (PRENATAL VITAMIN PO) Take  by mouth.       No current facility-administered medications on file prior to visit.        Past obstetric, gynecological, medical, surgical, family and social history reviewed.  Relevant lab work and imaging reviewed.    Review of systems  Constitutional:  denies fever, chills, malaise.   ENT/Mouth:  denies sore throat, tinnitis  Eyes: denies vision changes/pain  CV:  denies chest pain  Respiratory:  denies cough/SOB  GI:  denies N/V, diarrhea, abdominal pain.    :   denies dysuria  Skin:  denies lesions or pruritis   Neuro:  denies weakness, focal neurologic symptoms    Vitals:    23 0911   BP: 135/82   BP Location: Right arm   Patient Position: Sitting   Cuff Size: Adult   Pulse: 71   Temp: 92.3 øF (33.5 øC)   TempSrc: Temporal   Weight: 84.1 kg (185 lb 6.4 oz)   Height: 165.1 cm (65\")         PHYSICAL EXAM   GENERAL: Not in acute distress, AAOx3, pleasant      ASSESSMENT/COUNSELIN y.o.   here for preconception visit     -Pregnancy  [ X ] stable  [   ] improving [  ] worsening    Diagnoses and all orders for this " visit:    1. Severe nonproliferative diabetic retinopathy associated with type 1 diabetes mellitus, macular edema presence unspecified, unspecified laterality (Primary)    2. Type 1 diabetes mellitus affecting pregnancy in second trimester, antepartum    3. Factor V Leiden mutation affecting pregnancy          -Factor V Leiden heterozygous  Lovenox with next pregnancy/   b    Pregestational DM, Type 1  She is following with her endocrinologist and doing well.  Previously counseled last pregnancy.  Her last AIC was 5.8 per her, which is great.      Diabetic retinopathy:  Pregnancy is associated with increased risk of development and progression of diabetic retinopathy (DR). Although pregnancy does not have any long term effect on DR, progression of retinopathy changes occur in 50%-60% of cases. The greatest risk of worsening occurs during the second trimester and persists as long as 12 months postpartum. The other factors found to be associated with its progression include duration of the diabetes, severity of retinopathy at conception, A1c/DM control, and co-existing HTN.  We discussed the two big important things for here were 1) having well controlled DM with an A1c less than 6 prior to conception (she already has this) and 2) stabilization of diabetic eye disease.  She has gotten several laser treatments and is still getting injections.  She will send me what injections she is getting to determine if safe in pregnancy--I told her the laser treatment is safe and recommended during pregnancy if necessary.      Because of the increased risk of progression of the disease in pregnancy, conception should be delayed till the ocular disease is treated and stabilized and laser photocoagulation should be promptly instituted in all cases of severe non-proliferative retinopathy and should not be delayed till the patient develops early proliferative changes. Good diabetic control before and during pregnancy can help prevent  this increase in the progression and serious vision loss.      She has frozen embryos and is also exploring surrogacy, which I think is a viable option for her.  I do not think pregnancy is contraindicated if she gets her eye disease stabilized and understands the risks, however, surrogacy would eliminate those risks for her, so she is looking into this.  We are happy to provide documentation of our discussions/anything that would help if she chooses to go this route.      She will touch base about her current treatment and they are thinking of pregnancy early 2025.  Would be happy to see again and re-discuss closer to time if pt desires, also. Recommend prenatal vitamin 3 months preceding pregnancy if she decides that she will carry the child.      If she does undergo pregnancy, I recommend every 4-6 week visits with optho (close follow up) during pregnancy and postpartum.      Summary of Plan  -Continue treatment with optho for DM retinopathy.  Once stabilized from their standpoint, could in the months following undergo IVF if chooses to.  Pt also exploring surrogacy  -If pt chooses to become pregnant, recommend prenatal vitamin 3 months preceding pregnancy.        Follow-up: As needed with MFM    Thank you for the consult and opportunity to care for this patient.  Please feel free to reach out with any questions or concerns.      I spent 25 minutes caring for this patient on this date of service. This time includes time spent by me in the following activities: preparing for the visit, reviewing tests, obtaining and/or reviewing a separately obtained history, performing a medically appropriate examination and/or evaluation, counseling and educating the patient/family/caregiver and independently interpreting results and communicating that information with the patient/family/caregiver with greater than 50% spent in counseling and coordination of care.     Lawanda Valente MD FACOG  Maternal Fetal Medicine-[facility]  [facility]  Office: [phone]  [email or web address]

## 2023-08-11 ENCOUNTER — OFFICE VISIT (OUTPATIENT)
Dept: OBSTETRICS AND GYNECOLOGY | Facility: CLINIC | Age: 33
End: 2023-08-11
Payer: COMMERCIAL

## 2023-08-11 VITALS
TEMPERATURE: 92.3 F | DIASTOLIC BLOOD PRESSURE: 82 MMHG | SYSTOLIC BLOOD PRESSURE: 135 MMHG | HEART RATE: 71 BPM | HEIGHT: 65 IN | BODY MASS INDEX: 30.89 KG/M2 | WEIGHT: 185.4 LBS

## 2023-08-11 DIAGNOSIS — O99.119 FACTOR V LEIDEN MUTATION AFFECTING PREGNANCY: ICD-10-CM

## 2023-08-11 DIAGNOSIS — O24.012 TYPE 1 DIABETES MELLITUS AFFECTING PREGNANCY IN SECOND TRIMESTER, ANTEPARTUM: ICD-10-CM

## 2023-08-11 DIAGNOSIS — D68.51 FACTOR V LEIDEN MUTATION AFFECTING PREGNANCY: ICD-10-CM

## 2023-08-11 DIAGNOSIS — E10.3499: Primary | ICD-10-CM

## (undated) DEVICE — Device: Brand: PORTEX

## (undated) DEVICE — ANTIBACTERIAL UNDYED BRAIDED (POLYGLACTIN 910), SYNTHETIC ABSORBABLE SUTURE: Brand: COATED VICRYL

## (undated) DEVICE — GLV SURG BIOGEL LTX PF 7 1/2

## (undated) DEVICE — GLV SURG BIOGEL LTX PF 7

## (undated) DEVICE — NDL BLNT 18G 1 1/2IN

## (undated) DEVICE — STRIP SKIN CLOSEURE PROXI 1X5IN

## (undated) DEVICE — 3M(TM) TEGADERM(TM) TRANSPARENT FILM DRESSING FRAME STYLE 1627: Brand: 3M™ TEGADERM™

## (undated) DEVICE — 3M™ STERI-STRIP™ COMPOUND BENZOIN TINCTURE 40 BAGS/CARTON 4 CARTONS/CASE C1544: Brand: 3M™ STERI-STRIP™

## (undated) DEVICE — SUT PDS MONO 0 36 CT1 VIL PDP346H

## (undated) DEVICE — SUT MNCRYL PLS ANTIB UD 4/0 PS2 18IN

## (undated) DEVICE — SLV SCD CALF HEMOFORCE DVT THERP REPROC MD

## (undated) DEVICE — SUT MNCRYL 0/0 CTX 36IN Y398H

## (undated) DEVICE — SOL IRR NACL 0.9PCT BT 1000ML